# Patient Record
Sex: MALE | Race: WHITE | NOT HISPANIC OR LATINO | Employment: FULL TIME | URBAN - METROPOLITAN AREA
[De-identification: names, ages, dates, MRNs, and addresses within clinical notes are randomized per-mention and may not be internally consistent; named-entity substitution may affect disease eponyms.]

---
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
## 2020-02-04 ENCOUNTER — HISTORICAL (OUTPATIENT)
Dept: ADMINISTRATIVE | Facility: HOSPITAL | Age: 20
End: 2020-02-04

## 2020-02-14 ENCOUNTER — HISTORICAL (OUTPATIENT)
Dept: ADMINISTRATIVE | Facility: HOSPITAL | Age: 20
End: 2020-02-14

## 2020-02-28 ENCOUNTER — HISTORICAL (OUTPATIENT)
Dept: ADMINISTRATIVE | Facility: HOSPITAL | Age: 20
End: 2020-02-28

## 2020-06-07 ENCOUNTER — HISTORICAL (OUTPATIENT)
Dept: ADMINISTRATIVE | Facility: HOSPITAL | Age: 20
End: 2020-06-07

## 2020-08-19 ENCOUNTER — HISTORICAL (OUTPATIENT)
Dept: ADMINISTRATIVE | Facility: HOSPITAL | Age: 20
End: 2020-08-19

## 2022-04-09 ENCOUNTER — HISTORICAL (OUTPATIENT)
Dept: ADMINISTRATIVE | Facility: HOSPITAL | Age: 22
End: 2022-04-09

## 2022-04-18 ENCOUNTER — HISTORICAL (OUTPATIENT)
Dept: ADMINISTRATIVE | Facility: HOSPITAL | Age: 22
End: 2022-04-18

## 2022-04-28 LAB — URATE SERPL-MCNC: 6.9 MG/DL (ref 3.8–8.4)

## 2022-04-29 VITALS
WEIGHT: 169.06 LBS | SYSTOLIC BLOOD PRESSURE: 130 MMHG | BODY MASS INDEX: 25.62 KG/M2 | BODY MASS INDEX: 25.86 KG/M2 | DIASTOLIC BLOOD PRESSURE: 68 MMHG | HEIGHT: 68 IN | BODY MASS INDEX: 26.86 KG/M2 | SYSTOLIC BLOOD PRESSURE: 120 MMHG | HEIGHT: 68 IN | DIASTOLIC BLOOD PRESSURE: 72 MMHG | WEIGHT: 170.63 LBS | WEIGHT: 177.25 LBS | DIASTOLIC BLOOD PRESSURE: 68 MMHG | SYSTOLIC BLOOD PRESSURE: 130 MMHG | HEIGHT: 68 IN

## 2022-05-02 NOTE — HISTORICAL [facility]
This is a historical note converted from Jessica. Formatting and pictures may have been removed.  Please reference Jessica for original formatting and attached multimedia. Chief Complaint  hurt finger 8/15/right thumb  History of Present Illness  19-year-old white male complaining of?right thumb pain. ?Onset?August 15 after he was hit by a metal post?while building a fence.? The pain is localized over the proximal phalanx of the thumb.  Review of Systems  See HPI  Physical Exam  Vitals & Measurements  T:?37.0? ?C (Oral)? HR:?71(Peripheral)? BP:?120/68?  HT:?172.00?cm? WT:?80.400?kg? BMI:?27.18?  General: Awake, alert, no acute distress  Musculoskeletal:?Tenderness to palpation over the proximal phalanx of the thumb.? Active range of motion diminished?at the DIP and PIP joint of the thumb.? Superficial abrasion is present on the?radial aspect of the thumb?but there are no signs of cellulitis.  ?  Right thumb x-ray:?No fracture identified. ?No dislocation.  Assessment/Plan  1.?Contusion of right thumb ? S60.011A  Ice.  Nonsteroidal anti-inflammatories.   Problem List/Past Medical History  Ongoing  Occipital neuralgia  Historical  No qualifying data  Procedure/Surgical History  broken finger  Cholecystectomy  foreign object removed from arm   Medications  Advil 200 mg oral tablet, 400 mg= 2 tab(s), Oral, q4hr, PRN  Carafate 1 g oral tablet, 1 gm= 1 tab(s), Oral, BID  Pantoprazole 40 mg ORAL EC-Tablet, 40 mg= 1 tab(s), Oral, Daily  Allergies  No Known Allergies  No Known Medication Allergies  Social History  Abuse/Neglect  No, 07/27/2020  No, No, Yes, 07/22/2020  Alcohol  Never, 11/03/2017  Substance Use  Never, 11/03/2017  Tobacco  Never (less than 100 in lifetime), N/A, 07/27/2020  Never (less than 100 in lifetime), N/A, 07/22/2020  Never smoker, 11/03/2017  Family History  Hypertension.: Mother.

## 2022-05-02 NOTE — HISTORICAL [facility]
This is a historical note converted from Jessica. Formatting and pictures may have been removed.  Please reference Jessica for original formatting and attached multimedia. Chief Complaint  Back pain  History of Present Illness  19 old white male complaining of?neck pain, upper thoracic right-sided?back pain,?numbness and weakness within the right arm.? His symptoms have been persistent since onset several weeks ago.? He failed to respond to Medrol Dosepak and his symptoms seem to be worsening.? He also reports some numbness in the deltoid region extending all the way down to the lateral aspect of the right arm?near the elbow.? No fever.? No weight loss.  Review of Systems  See HPI  Physical Exam  Vitals & Measurements  T:?36.7? ?C (Oral)? HR:?68(Peripheral)? BP:?130/72?  HT:?173.5?cm? WT:?77.4?kg? BMI:?25.71?  General:?Awake, alert, no acute distress  Neck:?Pain with?right lateral rotation?localized to the base of the right neck.? Mild spasms?of the right paracervical muscles.  Back:?Tenderness to palpation over the right rhomboid?near the medial scapular border?with spasm of the muscle there.  Neuro:?Decreased sensation to light touch over the?right deltoid and right lateral upper arm?with?4 out of 5 strength with right triceps?and right sided intrinsic muscles of the hand  Right shoulder:?Forward flexion?and abduction to about 80 degrees?secondary to weakness.? The shoulder is nontender to palpation.  ?  Cervical spine x-rays: Normal  Assessment/Plan  1.?Cervical radiculopathy?M54.12  ?Due to nerve root findings with weakness?and normal x-ray?exam,?will check an MRI of the cervical spine without contrast for further evaluation.? He is instructed to take Tylenol and ibuprofen as needed for pain symptoms until we have results.? Further evaluation and management based on the MRI results.  Ordered:  MRI Cervical Spine W/O Contrast, Routine, 02/14/20 10:12:00 CST, Other (please specify), Cervical radiculopathy ; Cervical  disc disorder, None, Ambulatory, Rad Type, Order for future visit, [facility]ervical radiculopathy, Outside Facility, 02/14/20 1...  Office/Outpatient Visit Level 4 Established 47032 , Cervical radiculopathy, [facility]ENC[facility], 02/14/20 10:12:00 CST  ?  Orders:   Problem List/Past Medical History  Ongoing  Anosmia  Left arm weakness  Occipital neuralgia  Historical  No qualifying data  Procedure/Surgical History  broken finger  Cholecystectomy  foreign object removed from arm   Medications  Advil 200 mg oral tablet, 400 mg= 2 tab(s), Oral, q4hr, PRN  Allergies  No Known Allergies  No Known Medication Allergies  Social History  Alcohol  Never, 11/03/2017  Substance Use  Never, 11/03/2017  Tobacco  Never smoker, 11/03/2017  Family History  Hypertension.: Mother.

## 2022-05-16 ENCOUNTER — HISTORICAL (OUTPATIENT)
Dept: ADMINISTRATIVE | Facility: HOSPITAL | Age: 22
End: 2022-05-16

## 2023-04-24 ENCOUNTER — OFFICE VISIT (OUTPATIENT)
Dept: FAMILY MEDICINE | Facility: CLINIC | Age: 23
End: 2023-04-24
Payer: COMMERCIAL

## 2023-04-24 VITALS
DIASTOLIC BLOOD PRESSURE: 88 MMHG | BODY MASS INDEX: 29.1 KG/M2 | HEIGHT: 68 IN | HEART RATE: 79 BPM | WEIGHT: 192 LBS | TEMPERATURE: 101 F | SYSTOLIC BLOOD PRESSURE: 134 MMHG | OXYGEN SATURATION: 99 %

## 2023-04-24 DIAGNOSIS — R11.10 VOMITING, UNSPECIFIED VOMITING TYPE, UNSPECIFIED WHETHER NAUSEA PRESENT: ICD-10-CM

## 2023-04-24 DIAGNOSIS — R50.9 FEVER, UNSPECIFIED FEVER CAUSE: ICD-10-CM

## 2023-04-24 DIAGNOSIS — J10.1 TYPE A INFLUENZA: Primary | ICD-10-CM

## 2023-04-24 DIAGNOSIS — J02.9 SORE THROAT: ICD-10-CM

## 2023-04-24 LAB
CTP QC/QA: YES
CTP QC/QA: YES
FLUAV AG NPH QL: POSITIVE
FLUBV AG NPH QL: NEGATIVE
S PYO RRNA THROAT QL PROBE: NEGATIVE

## 2023-04-24 PROCEDURE — 1159F MED LIST DOCD IN RCRD: CPT | Mod: CPTII,,, | Performed by: NURSE PRACTITIONER

## 2023-04-24 PROCEDURE — 3008F PR BODY MASS INDEX (BMI) DOCUMENTED: ICD-10-PCS | Mod: CPTII,,, | Performed by: NURSE PRACTITIONER

## 2023-04-24 PROCEDURE — 99213 OFFICE O/P EST LOW 20 MIN: CPT | Mod: ,,, | Performed by: NURSE PRACTITIONER

## 2023-04-24 PROCEDURE — 1159F PR MEDICATION LIST DOCUMENTED IN MEDICAL RECORD: ICD-10-PCS | Mod: CPTII,,, | Performed by: NURSE PRACTITIONER

## 2023-04-24 PROCEDURE — 3075F PR MOST RECENT SYSTOLIC BLOOD PRESS GE 130-139MM HG: ICD-10-PCS | Mod: CPTII,,, | Performed by: NURSE PRACTITIONER

## 2023-04-24 PROCEDURE — 87880 STREP A ASSAY W/OPTIC: CPT | Mod: QW,,, | Performed by: NURSE PRACTITIONER

## 2023-04-24 PROCEDURE — 87880 POCT RAPID STREP A: ICD-10-PCS | Mod: QW,,, | Performed by: NURSE PRACTITIONER

## 2023-04-24 PROCEDURE — 3008F BODY MASS INDEX DOCD: CPT | Mod: CPTII,,, | Performed by: NURSE PRACTITIONER

## 2023-04-24 PROCEDURE — 3075F SYST BP GE 130 - 139MM HG: CPT | Mod: CPTII,,, | Performed by: NURSE PRACTITIONER

## 2023-04-24 PROCEDURE — 87400 POCT INFLUENZA A/B: ICD-10-PCS | Mod: QW,,, | Performed by: NURSE PRACTITIONER

## 2023-04-24 PROCEDURE — 3079F PR MOST RECENT DIASTOLIC BLOOD PRESSURE 80-89 MM HG: ICD-10-PCS | Mod: CPTII,,, | Performed by: NURSE PRACTITIONER

## 2023-04-24 PROCEDURE — 87400 INFLUENZA A/B EACH AG IA: CPT | Mod: QW,,, | Performed by: NURSE PRACTITIONER

## 2023-04-24 PROCEDURE — 99213 PR OFFICE/OUTPT VISIT, EST, LEVL III, 20-29 MIN: ICD-10-PCS | Mod: ,,, | Performed by: NURSE PRACTITIONER

## 2023-04-24 PROCEDURE — 3079F DIAST BP 80-89 MM HG: CPT | Mod: CPTII,,, | Performed by: NURSE PRACTITIONER

## 2023-04-24 RX ORDER — ONDANSETRON 4 MG/1
4 TABLET, ORALLY DISINTEGRATING ORAL EVERY 8 HOURS PRN
Qty: 15 TABLET | Refills: 0 | Status: SHIPPED | OUTPATIENT
Start: 2023-04-24 | End: 2023-05-02

## 2023-04-24 RX ORDER — GUAIFENESIN 1200 MG
TABLET, EXTENDED RELEASE 12 HR ORAL
COMMUNITY
End: 2023-05-02

## 2023-04-24 RX ORDER — IBUPROFEN 200 MG
TABLET ORAL EVERY 6 HOURS PRN
COMMUNITY
End: 2023-05-02

## 2023-04-24 RX ORDER — OSELTAMIVIR PHOSPHATE 75 MG/1
75 CAPSULE ORAL 2 TIMES DAILY
Qty: 10 CAPSULE | Refills: 0 | Status: SHIPPED | OUTPATIENT
Start: 2023-04-24 | End: 2023-04-29

## 2023-04-24 NOTE — PROGRESS NOTES
"SUBJECTIVE:  Leonides Rangel is a 22 y.o. male here for Fever, Emesis, and Fatigue      Fever   Associated symptoms include vomiting.   Emesis   Associated symptoms include a fever and myalgias.   Fatigue  Associated symptoms include fatigue, a fever, myalgias and vomiting.   Presents to the clinic with c/o fever, vomiting, fatigue and body aches.  Symptoms started on yesterday.  Temp was as high as 103 last night.    Mavericks allergies, medications, history, and problem list were updated as appropriate.    Review of Systems   Constitutional:  Positive for fatigue and fever.   Gastrointestinal:  Positive for vomiting.   Musculoskeletal:  Positive for myalgias.    A comprehensive review of symptoms was completed and negative except as noted above.    Recent Results (from the past 504 hour(s))   POCT Rapid Strep A    Collection Time: 04/24/23  3:21 PM   Result Value Ref Range    Rapid Strep A Screen Negative Negative     Acceptable Yes    POCT Influenza A/B Rapid Antigen    Collection Time: 04/24/23  3:21 PM   Result Value Ref Range    Rapid Influenza A Ag Positive (A) Negative    Rapid Influenza B Ag Negative Negative     Acceptable Yes        OBJECTIVE:  Vital signs  Vitals:    04/24/23 1451   BP: 134/88   BP Location: Right arm   Patient Position: Sitting   Pulse: 79   Temp: (!) 100.6 °F (38.1 °C)   TempSrc: Temporal   SpO2: 99%   Weight: 87.1 kg (192 lb)   Height: 5' 8.11" (1.73 m)        Physical Exam  Constitutional:       Appearance: He is ill-appearing.   HENT:      Head: Normocephalic and atraumatic.      Right Ear: Tympanic membrane, ear canal and external ear normal.      Left Ear: Tympanic membrane, ear canal and external ear normal.      Nose: Nose normal.      Mouth/Throat:      Mouth: Mucous membranes are moist.      Pharynx: Oropharynx is clear.   Eyes:      Conjunctiva/sclera: Conjunctivae normal.   Cardiovascular:      Rate and Rhythm: Normal rate and regular rhythm. "   Pulmonary:      Effort: Pulmonary effort is normal.      Breath sounds: Normal breath sounds.   Abdominal:      General: Bowel sounds are normal.      Palpations: Abdomen is soft.   Musculoskeletal:      Cervical back: Normal range of motion and neck supple.   Skin:     General: Skin is warm.      Capillary Refill: Capillary refill takes less than 2 seconds.   Neurological:      Mental Status: He is alert.   Psychiatric:         Mood and Affect: Mood normal.         Behavior: Behavior normal.         Judgment: Judgment normal.        ASSESSMENT/PLAN:  1. Type A influenza  -     oseltamivir (TAMIFLU) 75 MG capsule; Take 1 capsule (75 mg total) by mouth 2 (two) times daily. for 5 days  Dispense: 10 capsule; Refill: 0    2. Fever, unspecified fever cause  -     POCT Influenza A/B Rapid Antigen  -     POCT Rapid Strep A    3. Sore throat  -     POCT Rapid Strep A    4. Vomiting, unspecified vomiting type, unspecified whether nausea present  -     ondansetron (ZOFRAN-ODT) 4 MG TbDL; Take 1 tablet (4 mg total) by mouth every 8 (eight) hours as needed.  Dispense: 15 tablet; Refill: 0        Follow Up:  No follow-ups on file.

## 2023-04-27 ENCOUNTER — HOSPITAL ENCOUNTER (EMERGENCY)
Facility: HOSPITAL | Age: 23
Discharge: HOME | End: 2023-04-27
Attending: FAMILY MEDICINE
Payer: COMMERCIAL

## 2023-04-27 ENCOUNTER — NURSE TRIAGE (OUTPATIENT)
Dept: ADMINISTRATIVE | Facility: CLINIC | Age: 23
End: 2023-04-27
Payer: COMMERCIAL

## 2023-04-27 VITALS
HEART RATE: 90 BPM | BODY MASS INDEX: 28.79 KG/M2 | WEIGHT: 190 LBS | SYSTOLIC BLOOD PRESSURE: 118 MMHG | OXYGEN SATURATION: 95 % | TEMPERATURE: 98 F | HEIGHT: 68 IN | RESPIRATION RATE: 18 BRPM | DIASTOLIC BLOOD PRESSURE: 78 MMHG

## 2023-04-27 DIAGNOSIS — J11.1 INFLUENZA: ICD-10-CM

## 2023-04-27 DIAGNOSIS — B34.9 VIRAL SYNDROME: Primary | ICD-10-CM

## 2023-04-27 PROCEDURE — 25000003 PHARM REV CODE 250: Performed by: FAMILY MEDICINE

## 2023-04-27 PROCEDURE — 99284 EMERGENCY DEPT VISIT MOD MDM: CPT | Mod: 25

## 2023-04-27 PROCEDURE — 96374 THER/PROPH/DIAG INJ IV PUSH: CPT

## 2023-04-27 PROCEDURE — 96375 TX/PRO/DX INJ NEW DRUG ADDON: CPT

## 2023-04-27 PROCEDURE — 96361 HYDRATE IV INFUSION ADD-ON: CPT

## 2023-04-27 PROCEDURE — 63600175 PHARM REV CODE 636 W HCPCS: Performed by: NURSE PRACTITIONER

## 2023-04-27 PROCEDURE — 25000003 PHARM REV CODE 250: Performed by: NURSE PRACTITIONER

## 2023-04-27 RX ORDER — SODIUM CHLORIDE 9 MG/ML
1000 INJECTION, SOLUTION INTRAVENOUS
Status: COMPLETED | OUTPATIENT
Start: 2023-04-27 | End: 2023-04-27

## 2023-04-27 RX ORDER — DEXAMETHASONE 4 MG/1
4 TABLET ORAL DAILY
Qty: 4 TABLET | Refills: 0 | Status: SHIPPED | OUTPATIENT
Start: 2023-04-27 | End: 2023-05-02

## 2023-04-27 RX ORDER — ACETAMINOPHEN 500 MG
1000 TABLET ORAL
Status: COMPLETED | OUTPATIENT
Start: 2023-04-27 | End: 2023-04-27

## 2023-04-27 RX ORDER — DEXAMETHASONE SODIUM PHOSPHATE 4 MG/ML
4 INJECTION, SOLUTION INTRA-ARTICULAR; INTRALESIONAL; INTRAMUSCULAR; INTRAVENOUS; SOFT TISSUE
Status: COMPLETED | OUTPATIENT
Start: 2023-04-27 | End: 2023-04-27

## 2023-04-27 RX ORDER — KETOROLAC TROMETHAMINE 30 MG/ML
15 INJECTION, SOLUTION INTRAMUSCULAR; INTRAVENOUS
Status: COMPLETED | OUTPATIENT
Start: 2023-04-27 | End: 2023-04-27

## 2023-04-27 RX ADMIN — KETOROLAC TROMETHAMINE 15 MG: 30 INJECTION, SOLUTION INTRAMUSCULAR; INTRAVENOUS at 07:04

## 2023-04-27 RX ADMIN — SODIUM CHLORIDE 1000 ML: 9 INJECTION, SOLUTION INTRAVENOUS at 08:04

## 2023-04-27 RX ADMIN — ACETAMINOPHEN 1000 MG: 500 TABLET, FILM COATED ORAL at 07:04

## 2023-04-27 RX ADMIN — DEXAMETHASONE SODIUM PHOSPHATE 4 MG: 4 INJECTION, SOLUTION INTRA-ARTICULAR; INTRALESIONAL; INTRAMUSCULAR; INTRAVENOUS; SOFT TISSUE at 07:04

## 2023-04-27 RX ADMIN — SODIUM CHLORIDE 1000 ML: 9 INJECTION, SOLUTION INTRAVENOUS at 07:04

## 2023-04-27 NOTE — TELEPHONE ENCOUNTER
Vianey reports that pt was seen 4/24/23 with Flu A. Now c/o temp 103.4 ax and chest pain 4/10. Care advise to go to ED per protocol. Verbalized understanding. Encounter routed to provider.       Reason for Disposition   Chest pain  (Exception: MILD central chest pain, present only when coughing)    Additional Information   Negative: SEVERE difficulty breathing (e.g., struggling for each breath, speaks in single words)   Negative: Bluish (or gray) lips or face now   Negative: Shock suspected (e.g., cold/pale/clammy skin, too weak to stand, low BP, rapid pulse)   Negative: Sounds like a life-threatening emergency to the triager    Protocols used: Influenza Follow-up Call-A-

## 2023-04-27 NOTE — Clinical Note
"Leonides Whitman"Juan Carlos was seen and treated in our emergency department on 4/27/2023.  He may return to work on 05/01/2023.       If you have any questions or concerns, please don't hesitate to call.      GRUPO Jose"

## 2023-04-28 NOTE — ED PROVIDER NOTES
Encounter Date: 4/27/2023       History     Chief Complaint   Patient presents with    Influenza     Dx flu on Monday reports to ed with worse symptoms.  Fever tachycardia body aches headache. Last tylenol 650mg 4pm and last advil 600mg 5pm     Influenza + Diagnosed Monday  Fever, and headache  On day 3 tamiflu  Vomited x 1 day, no n/v/d today tolerating intake      The history is provided by the patient and a parent. No  was used.   Review of patient's allergies indicates:  No Known Allergies  No past medical history on file.  No past surgical history on file.  No family history on file.  Social History     Tobacco Use    Smoking status: Never    Smokeless tobacco: Never     Review of Systems    Physical Exam     Initial Vitals [04/27/23 1907]   BP Pulse Resp Temp SpO2   125/74 (!) 133 18 (!) 103.5 °F (39.7 °C) 98 %      MAP       --         Physical Exam    Vitals reviewed.  Constitutional: He appears well-developed and well-nourished. No distress.   HENT:   Head: Normocephalic and atraumatic.   Mouth/Throat: No oropharyngeal exudate.   Eyes: EOM are normal. Pupils are equal, round, and reactive to light.   Neck: Neck supple.   Normal range of motion.  Cardiovascular:  Regular rhythm.           No murmur heard.  + tachycardic   Pulmonary/Chest: No respiratory distress. He has no wheezes.   Abdominal: Abdomen is soft. Bowel sounds are normal.   Musculoskeletal:         General: Normal range of motion.      Cervical back: Normal range of motion and neck supple.     Lymphadenopathy:     He has no cervical adenopathy.   Neurological: He is alert and oriented to person, place, and time. GCS score is 15. GCS eye subscore is 4. GCS verbal subscore is 5. GCS motor subscore is 6.   Skin: Skin is warm and dry. Capillary refill takes less than 2 seconds.   Psychiatric: He has a normal mood and affect.       ED Course   Procedures  Labs Reviewed - No data to display       Imaging Results    None           Medications   acetaminophen tablet 1,000 mg (1,000 mg Oral Given 4/27/23 1916)   0.9%  NaCl infusion (0 mLs Intravenous Stopped 4/27/23 2047)   ketorolac injection 15 mg (15 mg Intravenous Given 4/27/23 1949)   dexAMETHasone injection 4 mg (4 mg Intravenous Given 4/27/23 1949)   0.9%  NaCl infusion (1,000 mLs Intravenous New Bag 4/27/23 2022)     Medical Decision Making:   Initial Assessment:   Dehydration secondary to Viral syncrome  Differential Diagnosis:   influenza  ED Management:  Discussed hydration with patient and mother.    Increase fluids at home Pedialyte or liquid IV  Continue fever management and tamiflu    Return if any new or worsening symptoms    Symptoms improved aftertreatment patient and mother verbalize understanding of discharge and are in agreement with paln                        Clinical Impression:   Final diagnoses:  [B34.9] Viral syndrome (Primary)  [J11.1] Influenza        ED Disposition Condition    Discharge Stable          ED Prescriptions       Medication Sig Dispense Start Date End Date Auth. Provider    dexAMETHasone (DECADRON) 4 MG Tab Take 1 tablet (4 mg total) by mouth once daily. for 4 days 4 tablet 4/27/2023 5/1/2023 GRUPO Jose          Follow-up Information       Follow up With Specialties Details Why Contact Info    Aron Soriano MD Family Medicine Schedule an appointment as soon as possible for a visit  If symptoms worsen, As needed [address] [facility] [address]  [phone]               GRUPO Jose  04/27/23 2051       GRUPO Jose  04/27/23 2121

## 2023-04-29 ENCOUNTER — HOSPITAL ENCOUNTER (EMERGENCY)
Facility: HOSPITAL | Age: 23
Discharge: HOME | End: 2023-04-29
Attending: FAMILY MEDICINE
Payer: COMMERCIAL

## 2023-04-29 VITALS
SYSTOLIC BLOOD PRESSURE: 101 MMHG | HEART RATE: 99 BPM | BODY MASS INDEX: 28.79 KG/M2 | RESPIRATION RATE: 20 BRPM | WEIGHT: 190 LBS | DIASTOLIC BLOOD PRESSURE: 63 MMHG | OXYGEN SATURATION: 96 % | HEIGHT: 68 IN | TEMPERATURE: 98 F

## 2023-04-29 DIAGNOSIS — R31.9 URINARY TRACT INFECTION WITH HEMATURIA, SITE UNSPECIFIED: Primary | ICD-10-CM

## 2023-04-29 DIAGNOSIS — R05.9 COUGH: ICD-10-CM

## 2023-04-29 DIAGNOSIS — N39.0 URINARY TRACT INFECTION WITH HEMATURIA, SITE UNSPECIFIED: Primary | ICD-10-CM

## 2023-04-29 LAB
ABS NEUT CALC (OHS): 3.83 X10(3)/MCL (ref 2.1–9.2)
ALBUMIN SERPL-MCNC: 3.3 G/DL (ref 3.4–5)
ALBUMIN/GLOB SERPL: 1.1 RATIO
ALP SERPL-CCNC: 155 UNIT/L (ref 50–144)
ALT SERPL-CCNC: 100 UNIT/L (ref 1–45)
ANION GAP SERPL CALC-SCNC: 7 MEQ/L (ref 2–13)
APPEARANCE UR: CLEAR
AST SERPL-CCNC: 119 UNIT/L (ref 17–59)
BACTERIA #/AREA URNS AUTO: ABNORMAL /HPF
BILIRUB UR QL STRIP.AUTO: ABNORMAL MG/DL
BILIRUBIN DIRECT+TOT PNL SERPL-MCNC: 1.5 MG/DL (ref 0–1)
BUN SERPL-MCNC: 18 MG/DL (ref 7–20)
CALCIUM SERPL-MCNC: 8.4 MG/DL (ref 8.4–10.2)
CHLORIDE SERPL-SCNC: 104 MMOL/L (ref 98–110)
CO2 SERPL-SCNC: 24 MMOL/L (ref 21–32)
COLOR UR AUTO: YELLOW
CREAT SERPL-MCNC: 1.22 MG/DL (ref 0.66–1.25)
CREAT/UREA NIT SERPL: 15 (ref 12–20)
ERYTHROCYTE [DISTWIDTH] IN BLOOD BY AUTOMATED COUNT: 12.6 % (ref 11.6–14.4)
GFR SERPLBLD CREATININE-BSD FMLA CKD-EPI: 85 MLS/MIN/1.73/M2
GLOBULIN SER-MCNC: 3 GM/DL (ref 2–3.9)
GLUCOSE SERPL-MCNC: 112 MG/DL (ref 70–115)
GLUCOSE UR QL STRIP.AUTO: 100 MG/DL
HCT VFR BLD AUTO: 35.8 % (ref 36–52)
HGB BLD-MCNC: 12.6 G/DL (ref 13–18)
HYALINE CASTS URNS QL MICRO: ABNORMAL /LPF
IMM GRANULOCYTES # BLD AUTO: 0.04 X10(3)/MCL (ref 0–0.03)
IMM GRANULOCYTES NFR BLD AUTO: 0.9 % (ref 0–0.5)
KETONES UR QL STRIP.AUTO: ABNORMAL MG/DL
LEUKOCYTE ESTERASE UR QL STRIP.AUTO: NEGATIVE UNIT/L
LYMPHOCYTES NFR BLD MANUAL: 0.36 X10(3)/MCL
LYMPHOCYTES NFR BLD MANUAL: 8 % (ref 13–40)
MCH RBC QN AUTO: 29 PG (ref 27–34)
MCV RBC AUTO: 82.3 FL (ref 79–99)
MEAN CELL HEMOGLOBIN CONCENTRATION (OHS) G/DL: 35.2 G/DL (ref 31–37)
MONOCYTES NFR BLD MANUAL: 0.32 X10(3)/MCL (ref 0.1–1.3)
MONOCYTES NFR BLD MANUAL: 7 % (ref 2–11)
MUCOUS THREADS URNS QL MICRO: ABNORMAL /LPF
NEUTROPHILS NFR BLD MANUAL: 84 % (ref 47–80)
NEUTS BAND NFR BLD MANUAL: 1 % (ref 0–11)
NITRITE UR QL STRIP.AUTO: POSITIVE
NRBC BLD AUTO-RTO: 0 % (ref 0–1)
PH UR STRIP.AUTO: 5.5 [PH]
PLATELET # BLD AUTO: 52 X10(3)/MCL (ref 140–371)
PLATELET # BLD EST: ABNORMAL 10*3/UL
PMV BLD AUTO: 12.2 FL (ref 9.4–12.4)
POTASSIUM SERPL-SCNC: 3.2 MMOL/L (ref 3.5–5.1)
PROT SERPL-MCNC: 6.3 GM/DL (ref 6.3–8.2)
PROT UR QL STRIP.AUTO: 100 MG/DL
RBC # BLD AUTO: 4.35 X10(6)/MCL (ref 4–6)
RBC #/AREA URNS AUTO: ABNORMAL /HPF
RBC MORPH BLD: NORMAL
RBC UR QL AUTO: ABNORMAL UNIT/L
SODIUM SERPL-SCNC: 135 MMOL/L (ref 135–145)
SP GR UR STRIP.AUTO: 1.01
SQUAMOUS #/AREA URNS AUTO: ABNORMAL /HPF
UROBILINOGEN UR STRIP-ACNC: 2 MG/DL
WBC # SPEC AUTO: 4.5 X10(3)/MCL (ref 4–11.5)
WBC #/AREA URNS AUTO: ABNORMAL /HPF

## 2023-04-29 PROCEDURE — 99284 EMERGENCY DEPT VISIT MOD MDM: CPT | Mod: 25

## 2023-04-29 PROCEDURE — 25000003 PHARM REV CODE 250: Performed by: FAMILY MEDICINE

## 2023-04-29 PROCEDURE — 80053 COMPREHEN METABOLIC PANEL: CPT | Performed by: FAMILY MEDICINE

## 2023-04-29 PROCEDURE — 96360 HYDRATION IV INFUSION INIT: CPT

## 2023-04-29 PROCEDURE — 87088 URINE BACTERIA CULTURE: CPT | Performed by: FAMILY MEDICINE

## 2023-04-29 PROCEDURE — 85027 COMPLETE CBC AUTOMATED: CPT | Performed by: FAMILY MEDICINE

## 2023-04-29 PROCEDURE — 85025 COMPLETE CBC W/AUTO DIFF WBC: CPT | Performed by: FAMILY MEDICINE

## 2023-04-29 PROCEDURE — 36415 COLL VENOUS BLD VENIPUNCTURE: CPT | Performed by: FAMILY MEDICINE

## 2023-04-29 PROCEDURE — 81001 URINALYSIS AUTO W/SCOPE: CPT | Performed by: FAMILY MEDICINE

## 2023-04-29 RX ORDER — ACETAMINOPHEN 500 MG
1000 TABLET ORAL
Status: COMPLETED | OUTPATIENT
Start: 2023-04-29 | End: 2023-04-29

## 2023-04-29 RX ORDER — LEVOFLOXACIN 500 MG/1
500 TABLET, FILM COATED ORAL DAILY
Qty: 7 TABLET | Refills: 0 | Status: SHIPPED | OUTPATIENT
Start: 2023-04-29 | End: 2023-05-03 | Stop reason: SINTOL

## 2023-04-29 RX ORDER — SODIUM CHLORIDE 9 MG/ML
1000 INJECTION, SOLUTION INTRAVENOUS ONCE
Status: DISCONTINUED | OUTPATIENT
Start: 2023-04-29 | End: 2023-04-29 | Stop reason: HOSPADM

## 2023-04-29 RX ADMIN — SODIUM CHLORIDE 1000 ML: 9 INJECTION, SOLUTION INTRAVENOUS at 11:04

## 2023-04-29 RX ADMIN — ACETAMINOPHEN 1000 MG: 500 TABLET, FILM COATED ORAL at 10:04

## 2023-04-29 NOTE — ED PROVIDER NOTES
Encounter Date: 4/29/2023       History     Chief Complaint   Patient presents with    Abdominal Pain     C/o back pain, headache, fever, abd pain, pt reports was seen by np on Monday and dx w/ flu a, seen Thursday in er, given fluids, steroids and tramadol and a 1000mg tylenol, pt reports still not feeling well and I feel dehydrated.  Last took advil, and severe cold and flu med and 600mg advil at 9am     Chief Complaint  Patient presents with  · Abdominal Pain    C/o back pain, headache, fever, abd pain, pt reports was seen by np on Monday and dx w/ flu a, seen Thursday in er, given fluids, steroids and tramadol and a 1000mg tylenol, pt reports still not feeling well and I feel dehydrated.  Last took advil, and severe cold and flu med and 600mg advil at 9am   Patient is still having fever he complains of headache but there is no nuchal rigidity I flexed the neck 4 or 5 times and the patient did not had any increased intensity of his headache patient has been having cough and his SARs COVID test was negative I am going to look for superadded bacterial pneumonia I will check some blood work rule out leukocytosis patient has also been having dysuria check UA        Review of patient's allergies indicates:  No Known Allergies  History reviewed. No pertinent past medical history.  Past Surgical History:   Procedure Laterality Date    CHOLECYSTECTOMY       History reviewed. No pertinent family history.  Social History     Tobacco Use    Smoking status: Never    Smokeless tobacco: Never   Substance Use Topics    Alcohol use: Not Currently    Drug use: Never     Review of Systems   Constitutional:  Negative for activity change, appetite change, diaphoresis and fatigue.   HENT:  Negative for congestion, ear discharge, ear pain, hearing loss, postnasal drip, sinus pain and sore throat.    Eyes:  Negative for pain.   Respiratory:  Positive for cough and shortness of breath. Negative for apnea, choking and stridor.     Cardiovascular:  Negative for palpitations.   Gastrointestinal:  Negative for abdominal distention, abdominal pain, constipation and diarrhea.   Endocrine: Negative for cold intolerance and heat intolerance.   Genitourinary:  Positive for dysuria. Negative for difficulty urinating, enuresis, frequency, penile discharge, penile pain, scrotal swelling and testicular pain.   Neurological:  Positive for headaches. Negative for dizziness, seizures, syncope, facial asymmetry and light-headedness.   Hematological:  Negative for adenopathy.   Psychiatric/Behavioral:  Negative for agitation, behavioral problems, decreased concentration and hallucinations. The patient is not hyperactive.      Physical Exam     Initial Vitals [04/29/23 1008]   BP Pulse Resp Temp SpO2   (!) 98/45 (!) 130 20 (!) 102.2 °F (39 °C) 95 %      MAP       --         Physical Exam    Nursing note and vitals reviewed.  Constitutional: He appears well-developed.   HENT:   Head: Normocephalic.   Eyes: Pupils are equal, round, and reactive to light.   Neck:   Normal range of motion.  Cardiovascular:  Normal rate, regular rhythm, normal heart sounds and intact distal pulses.           Pulmonary/Chest: No respiratory distress. He has no wheezes. He has no rales.   Abdominal: Abdomen is soft. Bowel sounds are normal. He exhibits no mass. There is no abdominal tenderness. There is no rebound and no guarding.   Musculoskeletal:         General: Normal range of motion.      Cervical back: Normal range of motion.     Neurological: He is alert.   Skin: Skin is warm.   Psychiatric: He has a normal mood and affect. Thought content normal.       ED Course   Procedures  Labs Reviewed   COMPREHENSIVE METABOLIC PANEL - Abnormal; Notable for the following components:       Result Value    Potassium Level 3.2 (*)     Albumin Level 3.3 (*)     Bilirubin Total 1.5 (*)     Alkaline Phosphatase 155 (*)     Alanine Aminotransferase 100 (*)     Aspartate Aminotransferase 119  (*)     All other components within normal limits   CBC WITH DIFFERENTIAL - Abnormal; Notable for the following components:    Hgb 12.6 (*)     Hct 35.8 (*)     Platelet 52 (*)     IG# 0.04 (*)     IG% 0.9 (*)     All other components within normal limits   URINALYSIS - Abnormal; Notable for the following components:    Protein,  (*)     Glucose,  (*)     Ketones, UA Trace (*)     Blood, UA Moderate (*)     Bilirubin, UA Moderate (*)     Urobilinogen, UA 2.0 (*)     Nitrites, UA Positive (*)     All other components within normal limits    Narrative:      URINE STABILITY IS 2 HOURS AT ROOM TEMP OR    SIX HOURS REFRIGERATED. PERFORMING TESTING ON    SPECIMENS GREATER THAN THIS AGE MAY AFFECT THE    FOLLOWING TESTS:    PH          SPECIFIC GRAVITY           BLOOD    CLARITY     BILIRUBIN               UROBILINOGEN   MANUAL DIFFERENTIAL - Abnormal; Notable for the following components:    Neut Man 84 (*)     Lymph Man 8 (*)     Abs Lymp 0.36 (*)     Platelet Est Decreased (*)     All other components within normal limits   URINALYSIS, MICROSCOPIC - Abnormal; Notable for the following components:    Bacteria, UA 2+ (*)     Hyaline Casts, UA Few (*)     Mucous, UA Trace (*)     Squamous Epithelial Cells, UA Few (*)     All other components within normal limits   CULTURE, URINE   CBC W/ AUTO DIFFERENTIAL    Narrative:     The following orders were created for panel order CBC auto differential.  Procedure                               Abnormality         Status                     ---------                               -----------         ------                     CBC with Differential[[id number]]        Abnormal            Final result               Manual Differential[[id number]]          Abnormal            Final result                 Please view results for these tests on the individual orders.          Imaging Results              X-Ray Chest PA And Lateral (In process)                   X-Rays:    Independently Interpreted Readings:   Other Readings:   Infiltrate versus parapneumonic effusion on the left side  Medications   0.9%  NaCl infusion (has no administration in time range)   acetaminophen tablet 1,000 mg (1,000 mg Oral Given 4/29/23 1021)   sodium chloride 0.9% bolus 1,000 mL 1,000 mL (1,000 mLs Intravenous New Bag 4/29/23 1133)     Medical Decision Making:   ED Management:   Patient has persistent fever for more than 5 days unlikely for the influenza to last that long and there is evidence of leukocyte esterase in the urine patient also having dysuria and on the chest x-ray there is parapneumonic effusion versus infiltrate on the left side question bacterial pathology I am going to give him a trial of Levaquin to cover the pneumonia and the UTI as well and see the response I carefully palpated the neck twice and the patient did not had any nuchal rigidity                        Clinical Impression:   Final diagnoses:  [R05.9] Cough  [N39.0, R31.9] Urinary tract infection with hematuria, site unspecified (Primary)        ED Disposition Condition    Discharge Stable          ED Prescriptions       Medication Sig Dispense Start Date End Date Auth. Provider    levoFLOXacin (LEVAQUIN) 500 MG tablet Take 1 tablet (500 mg total) by mouth once daily. for 7 days 7 tablet 4/29/2023 5/6/2023 Enzo Junior MD          Follow-up Information       Follow up With Specialties Details Why Contact Info    Aron Soriano MD Family Medicine   [address] [facility]  [address] [facility] [address]  [phone]               Enzo Junior MD  04/29/23 8613

## 2023-05-02 ENCOUNTER — OFFICE VISIT (OUTPATIENT)
Dept: FAMILY MEDICINE | Facility: CLINIC | Age: 23
End: 2023-05-02
Payer: COMMERCIAL

## 2023-05-02 VITALS
OXYGEN SATURATION: 97 % | BODY MASS INDEX: 28.77 KG/M2 | HEIGHT: 68 IN | SYSTOLIC BLOOD PRESSURE: 116 MMHG | HEART RATE: 102 BPM | TEMPERATURE: 99 F | WEIGHT: 189.81 LBS | DIASTOLIC BLOOD PRESSURE: 70 MMHG

## 2023-05-02 DIAGNOSIS — J18.9 PNEUMONIA OF LEFT LOWER LOBE DUE TO INFECTIOUS ORGANISM: Primary | ICD-10-CM

## 2023-05-02 DIAGNOSIS — D69.6 THROMBOCYTOPENIA: ICD-10-CM

## 2023-05-02 DIAGNOSIS — R74.8 ELEVATED LIVER ENZYMES: ICD-10-CM

## 2023-05-02 LAB
ABS NEUT CALC (OHS): 7.2 X10(3)/MCL (ref 2.1–9.2)
ALBUMIN SERPL-MCNC: 3.3 G/DL (ref 3.4–5)
ALBUMIN/GLOB SERPL: 1.1 RATIO
ALP SERPL-CCNC: 189 UNIT/L (ref 50–144)
ALT SERPL-CCNC: 161 UNIT/L (ref 1–45)
ANION GAP SERPL CALC-SCNC: 9 MEQ/L (ref 2–13)
AST SERPL-CCNC: 163 UNIT/L (ref 17–59)
BACTERIA UR CULT: NO GROWTH
BILIRUBIN DIRECT+TOT PNL SERPL-MCNC: 0.9 MG/DL (ref 0–1)
BUN SERPL-MCNC: 21 MG/DL (ref 7–20)
CALCIUM SERPL-MCNC: 8 MG/DL (ref 8.4–10.2)
CHLORIDE SERPL-SCNC: 107 MMOL/L (ref 98–110)
CK SERPL-CCNC: 55 U/L (ref 55–170)
CO2 SERPL-SCNC: 24 MMOL/L (ref 21–32)
CREAT SERPL-MCNC: 0.82 MG/DL (ref 0.66–1.25)
CREAT/UREA NIT SERPL: 26 (ref 12–20)
ERYTHROCYTE [DISTWIDTH] IN BLOOD BY AUTOMATED COUNT: 13.5 %
GFR SERPLBLD CREATININE-BSD FMLA CKD-EPI: >90 MLS/MIN/1.73/M2
GLOBULIN SER-MCNC: 2.9 GM/DL (ref 2–3.9)
GLUCOSE SERPL-MCNC: 114 MG/DL (ref 70–115)
HCT VFR BLD AUTO: 39.1 % (ref 36–52)
HGB BLD-MCNC: 13.6 G/DL (ref 13–18)
IMM GRANULOCYTES # BLD AUTO: 0.57 X10(3)/MCL (ref 0–0.03)
IMM GRANULOCYTES NFR BLD AUTO: 4.5 % (ref 0–0.5)
LYMPHOCYTES NFR BLD MANUAL: 2.53 X10(3)/MCL
LYMPHOCYTES NFR BLD MANUAL: 20 % (ref 13–40)
MCH RBC QN AUTO: 28.3 PG (ref 27–34)
MCHC RBC AUTO-ENTMCNC: 34.8 G/DL (ref 31–37)
MCV RBC AUTO: 81.5 FL (ref 79–99)
MONOCYTES NFR BLD MANUAL: 2.91 X10(3)/MCL (ref 0.1–1.3)
MONOCYTES NFR BLD MANUAL: 23 % (ref 2–11)
NEUTROPHILS NFR BLD MANUAL: 57 % (ref 47–80)
NRBC BLD AUTO-RTO: 0 % (ref 0–1)
PLATELET # BLD AUTO: 140 X10(3)/MCL (ref 140–371)
PLATELET # BLD EST: ADEQUATE 10*3/UL
PMV BLD AUTO: 12.8 FL (ref 9.4–12.4)
POTASSIUM SERPL-SCNC: 3.7 MMOL/L (ref 3.5–5.1)
PROT SERPL-MCNC: 6.2 GM/DL (ref 6.3–8.2)
RBC # BLD AUTO: 4.8 X10(6)/MCL (ref 4–6)
RBC MORPH BLD: NORMAL
SODIUM SERPL-SCNC: 140 MMOL/L (ref 135–145)
WBC # SPEC AUTO: 12.64 X10(3)/MCL (ref 4–11.5)

## 2023-05-02 PROCEDURE — 1159F MED LIST DOCD IN RCRD: CPT | Mod: CPTII,,, | Performed by: FAMILY MEDICINE

## 2023-05-02 PROCEDURE — 80053 COMPREHEN METABOLIC PANEL: CPT | Performed by: FAMILY MEDICINE

## 2023-05-02 PROCEDURE — 3074F SYST BP LT 130 MM HG: CPT | Mod: CPTII,,, | Performed by: FAMILY MEDICINE

## 2023-05-02 PROCEDURE — 1160F PR REVIEW ALL MEDS BY PRESCRIBER/CLIN PHARMACIST DOCUMENTED: ICD-10-PCS | Mod: CPTII,,, | Performed by: FAMILY MEDICINE

## 2023-05-02 PROCEDURE — 99214 OFFICE O/P EST MOD 30 MIN: CPT | Mod: ,,, | Performed by: FAMILY MEDICINE

## 2023-05-02 PROCEDURE — 87899 AGENT NOS ASSAY W/OPTIC: CPT | Mod: 90 | Performed by: FAMILY MEDICINE

## 2023-05-02 PROCEDURE — 3078F PR MOST RECENT DIASTOLIC BLOOD PRESSURE < 80 MM HG: ICD-10-PCS | Mod: CPTII,,, | Performed by: FAMILY MEDICINE

## 2023-05-02 PROCEDURE — 1160F RVW MEDS BY RX/DR IN RCRD: CPT | Mod: CPTII,,, | Performed by: FAMILY MEDICINE

## 2023-05-02 PROCEDURE — 3078F DIAST BP <80 MM HG: CPT | Mod: CPTII,,, | Performed by: FAMILY MEDICINE

## 2023-05-02 PROCEDURE — 3008F BODY MASS INDEX DOCD: CPT | Mod: CPTII,,, | Performed by: FAMILY MEDICINE

## 2023-05-02 PROCEDURE — 3074F PR MOST RECENT SYSTOLIC BLOOD PRESSURE < 130 MM HG: ICD-10-PCS | Mod: CPTII,,, | Performed by: FAMILY MEDICINE

## 2023-05-02 PROCEDURE — 85025 COMPLETE CBC W/AUTO DIFF WBC: CPT | Performed by: FAMILY MEDICINE

## 2023-05-02 PROCEDURE — 99214 PR OFFICE/OUTPT VISIT, EST, LEVL IV, 30-39 MIN: ICD-10-PCS | Mod: ,,, | Performed by: FAMILY MEDICINE

## 2023-05-02 PROCEDURE — 82550 ASSAY OF CK (CPK): CPT | Performed by: FAMILY MEDICINE

## 2023-05-02 PROCEDURE — 1159F PR MEDICATION LIST DOCUMENTED IN MEDICAL RECORD: ICD-10-PCS | Mod: CPTII,,, | Performed by: FAMILY MEDICINE

## 2023-05-02 PROCEDURE — 85027 COMPLETE CBC AUTOMATED: CPT | Performed by: FAMILY MEDICINE

## 2023-05-02 PROCEDURE — 3008F PR BODY MASS INDEX (BMI) DOCUMENTED: ICD-10-PCS | Mod: CPTII,,, | Performed by: FAMILY MEDICINE

## 2023-05-02 NOTE — PROGRESS NOTES
"SUBJECTIVE:  HPI    Leonides Rangel is a 22 y.o. male here for Shortness of Breath, Fever, and Cough.  Here for follow-up after recent ER visit on April 29, 2023.  I have reviewed those notes and diagnostic studies including labs and x-ray.    His symptoms actually began about 9 days ago with myalgias followed by fever as high as 105°.  He had persistent fever, myalgias, malaise for the next several days and went to the emergency room on April 27th.  He also had associated gastrointestinal upset with nausea, vomiting and diarrhea.  He was given some IV fluids, diagnosed with a viral infection and given some IV steroids.  He was told that he had influenza.  He continued to have fever and developed a cough and returned to the emergency room on April 29th because his urine became dark colored.  He had a chest x-ray which revealed left lower lobe pneumonia with consolidation and an abnormal urinalysis and was told he may have a urinary tract infection as well.  He was prescribed Levaquin, given some more IV fluids and discharged to home.      Today, he returns to the office reporting that he has significant shortness of breath with dyspnea on minimal exertion.  He was unable to walk up several flights of stairs yesterday.  His fever has improved and overall he feels a little better but he continues to have general malaise, myalgias and worsening cough with dyspnea.  He reports that he is having bilateral thigh pain and weakness.  He has difficulty getting up from the chair.  Of note, his wife developed similar symptoms.      Leonides's allergies, medications, history, and problem list were updated as appropriate.    ROS:  Pertinent ROS as above, otherwise negative    OBJECTIVE:  Vital signs  Visit Vitals  /70 (BP Location: Left arm, Patient Position: Sitting, BP Method: Medium (Manual))   Pulse 102   Temp 99.1 °F (37.3 °C) (Temporal)   Ht 5' 8.31" (1.735 m)   Wt 86.1 kg (189 lb 12.8 oz)   SpO2 97%   BMI 28.60 kg/m² "          PHYSICAL EXAM:  General:  Awake, alert, slightly dyspneic at rest with splinting of respirations  Eyes:  Pupils equal, round, reactive to light.  Conjunctiva normal bilaterally.  Ears:  Bilateral external auditory canals normal.  Bilateral tympanic membranes normal.  Nares:  Bilateral turbinate erythema and edema with clear rhinorrhea.  Oropharynx:  Postnasal drainage present.  No erythema or exudate.  Neck:  No lymphadenopathy  Cardiovascular:  Tachycardic, regular rhythm, no murmurs  Respiratory:  Dyspneic at rest.  Splinting respirations.  Shallow depth of inspiration.  Decreased breath sounds in left lower lobe and left mid lung field with faint crackles appreciated in the lower lobe.  Skin: No rashes      Two-view chest x-ray, my interpretation prior to radiology report:  Shallow depth of inspiration but definite improvement compared to his last x-ray on April 29, 2023 with reduction and atelectasis and consolidation within the left lower lobe and in the interstitial areas.    ASSESSMENT/PLAN:  1. Pneumonia of left lower lobe due to infectious organism  Assessment & Plan:  I am highly suspicious for an atypical pneumonia like Legionella.    We will repeat a CBC, CMP, CPK level    Check a urine Legionella antigen test.  We are unable to collect a sputum at this time because the patient can not produce one.  I suspect that the urine Legionella test will be low yield in light of his current antimicrobial therapy and improving febrile status and improving chest x-ray.    Continue levofloxacin 500 mg daily.      Return to clinic for recheck clinical exam tomorrow.    Orders:  -     X-Ray Chest PA And Lateral; Future; Expected date: 05/02/2023  -     Comprehensive Metabolic Panel; Future; Expected date: 05/02/2023  -     CBC Auto Differential; Future; Expected date: 05/02/2023  -     Legionella antigen, urine  -     CK; Future; Expected date: 05/02/2023    2. Thrombocytopenia  -     Comprehensive Metabolic  Panel; Future; Expected date: 05/02/2023  -     CBC Auto Differential; Future; Expected date: 05/02/2023  -     Legionella antigen, urine  -     CK; Future; Expected date: 05/02/2023    3. Elevated liver enzymes  -     Comprehensive Metabolic Panel; Future; Expected date: 05/02/2023  -     CBC Auto Differential; Future; Expected date: 05/02/2023  -     Legionella antigen, urine  -     CK; Future; Expected date: 05/02/2023      Follow Up:  Follow up in about 1 day (around 5/3/2023) for Follow-up.

## 2023-05-02 NOTE — ASSESSMENT & PLAN NOTE
I am highly suspicious for an atypical pneumonia like Legionella.    We will repeat a CBC, CMP, CPK level    Check a urine Legionella antigen test.  We are unable to collect a sputum at this time because the patient can not produce one.  I suspect that the urine Legionella test will be low yield in light of his current antimicrobial therapy and improving febrile status and improving chest x-ray.    Continue levofloxacin 500 mg daily.      Return to clinic for recheck clinical exam tomorrow.

## 2023-05-03 ENCOUNTER — OFFICE VISIT (OUTPATIENT)
Dept: FAMILY MEDICINE | Facility: CLINIC | Age: 23
End: 2023-05-03
Payer: COMMERCIAL

## 2023-05-03 VITALS
DIASTOLIC BLOOD PRESSURE: 68 MMHG | HEART RATE: 110 BPM | TEMPERATURE: 98 F | SYSTOLIC BLOOD PRESSURE: 98 MMHG | WEIGHT: 189.19 LBS | BODY MASS INDEX: 28.67 KG/M2 | HEIGHT: 68 IN | OXYGEN SATURATION: 99 %

## 2023-05-03 DIAGNOSIS — D69.6 THROMBOCYTOPENIA: ICD-10-CM

## 2023-05-03 DIAGNOSIS — J18.9 PNEUMONIA OF LEFT LOWER LOBE DUE TO INFECTIOUS ORGANISM: Primary | ICD-10-CM

## 2023-05-03 DIAGNOSIS — R74.8 ELEVATED LIVER ENZYMES: ICD-10-CM

## 2023-05-03 LAB
ABS NEUT CALC (OHS): 9.7 X10(3)/MCL (ref 2.1–9.2)
ALBUMIN SERPL-MCNC: 3.3 G/DL (ref 3.4–5)
ALBUMIN/GLOB SERPL: 1.1 RATIO
ALP SERPL-CCNC: 206 UNIT/L (ref 50–144)
ALT SERPL-CCNC: 209 UNIT/L (ref 1–45)
ANION GAP SERPL CALC-SCNC: 7 MEQ/L (ref 2–13)
AST SERPL-CCNC: 201 UNIT/L (ref 17–59)
BILIRUBIN DIRECT+TOT PNL SERPL-MCNC: 1.2 MG/DL (ref 0–1)
BUN SERPL-MCNC: 17 MG/DL (ref 7–20)
C-REACTIVE PROTEIN(NORTH LA, ST. MARY, RP & JENNINGS): 3.8 MG/DL (ref 0–0.9)
CALCIUM SERPL-MCNC: 8.1 MG/DL (ref 8.4–10.2)
CHLORIDE SERPL-SCNC: 109 MMOL/L (ref 98–110)
CO2 SERPL-SCNC: 25 MMOL/L (ref 21–32)
CREAT SERPL-MCNC: 1.06 MG/DL (ref 0.66–1.25)
CREAT/UREA NIT SERPL: 16 (ref 12–20)
EOSINOPHIL NFR BLD MANUAL: 0.16 X10(3)/MCL (ref 0–0.9)
EOSINOPHIL NFR BLD MANUAL: 1 % (ref 0–8)
ERYTHROCYTE [DISTWIDTH] IN BLOOD BY AUTOMATED COUNT: 14 %
GFR SERPLBLD CREATININE-BSD FMLA CKD-EPI: >90 MLS/MIN/1.73/M2
GLOBULIN SER-MCNC: 3.1 GM/DL (ref 2–3.9)
GLUCOSE SERPL-MCNC: 94 MG/DL (ref 70–115)
HCT VFR BLD AUTO: 40.4 % (ref 36–52)
HGB BLD-MCNC: 13.9 G/DL (ref 13–18)
IMM GRANULOCYTES # BLD AUTO: 1.29 X10(3)/MCL (ref 0–0.03)
IMM GRANULOCYTES NFR BLD AUTO: 8 % (ref 0–0.5)
LDH SERPL-CCNC: 869 U/L (ref 313–618)
LEGIONELLA AG UR QL: NEGATIVE
LYMPHOCYTES NFR BLD MANUAL: 25 % (ref 13–40)
LYMPHOCYTES NFR BLD MANUAL: 4.04 X10(3)/MCL
MCH RBC QN AUTO: 28.3 PG (ref 27–34)
MCHC RBC AUTO-ENTMCNC: 34.4 G/DL (ref 31–37)
MCV RBC AUTO: 82.3 FL (ref 79–99)
MONOCYTES NFR BLD MANUAL: 14 % (ref 2–11)
MONOCYTES NFR BLD MANUAL: 2.26 X10(3)/MCL (ref 0.1–1.3)
NEUTROPHILS NFR BLD MANUAL: 60 % (ref 47–80)
NRBC BLD AUTO-RTO: 0 % (ref 0–1)
PLATELET # BLD AUTO: 214 X10(3)/MCL (ref 140–371)
PLATELET # BLD EST: ADEQUATE 10*3/UL
PMV BLD AUTO: 11.8 FL (ref 9.4–12.4)
POTASSIUM SERPL-SCNC: 3.4 MMOL/L (ref 3.5–5.1)
PROT SERPL-MCNC: 6.4 GM/DL (ref 6.3–8.2)
RBC # BLD AUTO: 4.91 X10(6)/MCL (ref 4–6)
RBC MORPH BLD: NORMAL
SODIUM SERPL-SCNC: 141 MMOL/L (ref 135–145)
WBC # SPEC AUTO: 16.16 X10(3)/MCL (ref 4–11.5)

## 2023-05-03 PROCEDURE — 86140 C-REACTIVE PROTEIN: CPT | Performed by: FAMILY MEDICINE

## 2023-05-03 PROCEDURE — 86645 CMV ANTIBODY IGM: CPT | Mod: 90 | Performed by: FAMILY MEDICINE

## 2023-05-03 PROCEDURE — 3074F SYST BP LT 130 MM HG: CPT | Mod: CPTII,,, | Performed by: FAMILY MEDICINE

## 2023-05-03 PROCEDURE — 3008F BODY MASS INDEX DOCD: CPT | Mod: CPTII,,, | Performed by: FAMILY MEDICINE

## 2023-05-03 PROCEDURE — 1159F PR MEDICATION LIST DOCUMENTED IN MEDICAL RECORD: ICD-10-PCS | Mod: CPTII,,, | Performed by: FAMILY MEDICINE

## 2023-05-03 PROCEDURE — 99214 OFFICE O/P EST MOD 30 MIN: CPT | Mod: ,,, | Performed by: FAMILY MEDICINE

## 2023-05-03 PROCEDURE — 86665 EPSTEIN-BARR CAPSID VCA: CPT | Mod: 90 | Performed by: FAMILY MEDICINE

## 2023-05-03 PROCEDURE — 3074F PR MOST RECENT SYSTOLIC BLOOD PRESSURE < 130 MM HG: ICD-10-PCS | Mod: CPTII,,, | Performed by: FAMILY MEDICINE

## 2023-05-03 PROCEDURE — 85027 COMPLETE CBC AUTOMATED: CPT | Performed by: FAMILY MEDICINE

## 2023-05-03 PROCEDURE — 80053 COMPREHEN METABOLIC PANEL: CPT | Performed by: FAMILY MEDICINE

## 2023-05-03 PROCEDURE — 3078F PR MOST RECENT DIASTOLIC BLOOD PRESSURE < 80 MM HG: ICD-10-PCS | Mod: CPTII,,, | Performed by: FAMILY MEDICINE

## 2023-05-03 PROCEDURE — 3008F PR BODY MASS INDEX (BMI) DOCUMENTED: ICD-10-PCS | Mod: CPTII,,, | Performed by: FAMILY MEDICINE

## 2023-05-03 PROCEDURE — 85651 RBC SED RATE NONAUTOMATED: CPT | Performed by: FAMILY MEDICINE

## 2023-05-03 PROCEDURE — 83615 LACTATE (LD) (LDH) ENZYME: CPT | Performed by: FAMILY MEDICINE

## 2023-05-03 PROCEDURE — 87389 HIV-1 AG W/HIV-1&-2 AB AG IA: CPT | Performed by: FAMILY MEDICINE

## 2023-05-03 PROCEDURE — 1159F MED LIST DOCD IN RCRD: CPT | Mod: CPTII,,, | Performed by: FAMILY MEDICINE

## 2023-05-03 PROCEDURE — 99214 PR OFFICE/OUTPT VISIT, EST, LEVL IV, 30-39 MIN: ICD-10-PCS | Mod: ,,, | Performed by: FAMILY MEDICINE

## 2023-05-03 PROCEDURE — 3078F DIAST BP <80 MM HG: CPT | Mod: CPTII,,, | Performed by: FAMILY MEDICINE

## 2023-05-03 RX ORDER — CEFDINIR 300 MG/1
300 CAPSULE ORAL 2 TIMES DAILY
Qty: 20 CAPSULE | Refills: 0 | Status: ON HOLD | OUTPATIENT
Start: 2023-05-03 | End: 2023-05-05

## 2023-05-03 RX ORDER — DOXYCYCLINE 100 MG/1
100 CAPSULE ORAL EVERY 12 HOURS
Qty: 20 CAPSULE | Refills: 0 | Status: ON HOLD | OUTPATIENT
Start: 2023-05-03 | End: 2023-05-05

## 2023-05-03 NOTE — PROGRESS NOTES
"SUBJECTIVE:  HPI    Leonides Rangel is a 22 y.o. male here for Follow-up (1 day ).  Here for follow-up as per yesterday's office note.  Last night, the patient awakened on several occasions with night sweats and severe dry mouth and coughing episodes.  His shortness of breath is definitely improved today.  However, he continues to have myalgias and some proximal muscle weakness.  He is not having any sputum production, diarrhea, documented fever in the last 24 hours, ulcers in the mouth, rash.    [place] allergies, medications, history, and problem list were updated as appropriate.    ROS:  Pertinent ROS as above, otherwise negative    OBJECTIVE:  Vital signs  Visit Vitals  BP 98/68 (BP Location: Right arm)   Pulse 110   Temp 97.7 °F (36.5 °C) (Temporal)   Ht 5' 8.31" (1.735 m)   Wt 85.8 kg (189 lb 3.2 oz)   SpO2 99%   BMI 28.51 kg/m²          PHYSICAL EXAM:  General: Awake, alert, no acute distress  ENT:  External auditory canals normal bilaterally .  Tympanic membranes normal bilaterally.  Oropharynx clear.    Neck:  No bruits, no masses  Cardiovascular:  Mild tachycardia, normal rate.  No murmurs.  Respiratory: Clear to auscultation bilaterally, normal effort.  He is not splinting today and is able to take a much deeper breath.  Abdomen: Soft, nontender, nondistended, no hepatosplenomegaly   Extremities:  No cyanosis, no clubbing, no edema  Skin:  No rashes or appreciable lesions   Musculoskeletal:  Tenderness to his arms and legs bilaterally    May 2, 2023:  White blood cell count 12.64, platelets 140, creatinine 0.82, alkaline phosphatase 189, , , CPK 55    ASSESSMENT/PLAN:  1. Pneumonia of left lower lobe due to infectious organism    2. Thrombocytopenia    3. Elevated liver enzymes      I am again perplexed today as to the exact etiology of his underlying symptoms.  Mononucleosis is certainly in the differential diagnosis as well as acute HIV infection.  However, I can not say that I have seen " a consolidated pneumonia in the setting of mononucleosis in the past.  Additionally, he does not really have any HIV risk factors.      His chest x-ray is definitely better.  Clinically he seems somewhat better but I am still a little concerned because of his persistent tachycardia and myositis findings.    We will repeat a CBC, CMP today with an LDH, CRP, sed rate, EBV and CMV titers and HIV test.    We will also go ahead and discontinue his Levaquin in case that is what is contributing to his myositis findings.  Because of his leukocytosis, we will change to cefdinir and doxycycline.    We will see him back again tomorrow for clinical follow-up and exam.

## 2023-05-04 ENCOUNTER — HOSPITAL ENCOUNTER (OUTPATIENT)
Facility: HOSPITAL | Age: 23
Discharge: HOME | End: 2023-05-05
Attending: EMERGENCY MEDICINE | Admitting: INTERNAL MEDICINE
Payer: COMMERCIAL

## 2023-05-04 ENCOUNTER — OFFICE VISIT (OUTPATIENT)
Dept: FAMILY MEDICINE | Facility: CLINIC | Age: 23
End: 2023-05-04
Payer: COMMERCIAL

## 2023-05-04 VITALS
HEIGHT: 68 IN | BODY MASS INDEX: 28.61 KG/M2 | WEIGHT: 188.81 LBS | SYSTOLIC BLOOD PRESSURE: 100 MMHG | OXYGEN SATURATION: 97 % | TEMPERATURE: 100 F | DIASTOLIC BLOOD PRESSURE: 68 MMHG | HEART RATE: 101 BPM

## 2023-05-04 DIAGNOSIS — M60.09 INFECTIVE MYOSITIS OF MULTIPLE SITES: ICD-10-CM

## 2023-05-04 DIAGNOSIS — R05.9 COUGH: ICD-10-CM

## 2023-05-04 DIAGNOSIS — J18.9 PNEUMONIA OF LEFT LUNG DUE TO INFECTIOUS ORGANISM, UNSPECIFIED PART OF LUNG: Primary | ICD-10-CM

## 2023-05-04 DIAGNOSIS — J18.9 PNEUMONIA OF LEFT LOWER LOBE DUE TO INFECTIOUS ORGANISM: Primary | ICD-10-CM

## 2023-05-04 DIAGNOSIS — K75.9 HEPATITIS: ICD-10-CM

## 2023-05-04 DIAGNOSIS — B30.9 ACUTE VIRAL CONJUNCTIVITIS OF BOTH EYES: ICD-10-CM

## 2023-05-04 DIAGNOSIS — D69.6 THROMBOCYTOPENIA: ICD-10-CM

## 2023-05-04 DIAGNOSIS — R07.9 CHEST PAIN: ICD-10-CM

## 2023-05-04 DIAGNOSIS — I31.9 PERICARDITIS: ICD-10-CM

## 2023-05-04 PROBLEM — R74.8 ELEVATED LIVER ENZYMES: Status: RESOLVED | Noted: 2023-05-02 | Resolved: 2023-05-04

## 2023-05-04 PROBLEM — B34.9 ACUTE VIRAL DISEASE: Status: ACTIVE | Noted: 2023-05-04

## 2023-05-04 LAB
ABS NEUT (OLG): 8.24 X10(3)/MCL (ref 2.1–9.2)
ALBUMIN SERPL-MCNC: 2.9 G/DL (ref 3.5–5)
ALBUMIN/GLOB SERPL: 0.8 RATIO (ref 1.1–2)
ALP SERPL-CCNC: 158 UNIT/L (ref 40–150)
ALT SERPL-CCNC: 158 UNIT/L (ref 0–55)
AST SERPL-CCNC: 85 UNIT/L (ref 5–34)
B PERT.PT PRMT NPH QL NAA+NON-PROBE: NOT DETECTED
BASOPHILS NFR BLD MANUAL: 0.14 X10(3)/MCL (ref 0–0.2)
BASOPHILS NFR BLD MANUAL: 1 %
BILIRUBIN DIRECT+TOT PNL SERPL-MCNC: 0.6 MG/DL
BUN SERPL-MCNC: 10.5 MG/DL (ref 8.9–20.6)
C PNEUM DNA NPH QL NAA+NON-PROBE: NOT DETECTED
CALCIUM SERPL-MCNC: 8.6 MG/DL (ref 8.4–10.2)
CHLORIDE SERPL-SCNC: 109 MMOL/L (ref 98–107)
CK SERPL-CCNC: 56 U/L (ref 30–200)
CMV IGG SERPL QL IA: NEGATIVE
CMV IGM SERPL QL IA: NEGATIVE
CO2 SERPL-SCNC: 24 MMOL/L (ref 22–29)
CREAT SERPL-MCNC: 0.9 MG/DL (ref 0.73–1.18)
EBV NA AB SER QL: POSITIVE
EBV VCA IGG SER QL: POSITIVE
EBV VCA IGM SER QL: NEGATIVE
EOSINOPHIL NFR BLD MANUAL: 0.14 X10(3)/MCL (ref 0–0.9)
EOSINOPHIL NFR BLD MANUAL: 1 %
ERYTHROCYTE [DISTWIDTH] IN BLOOD BY AUTOMATED COUNT: 14.7 % (ref 11.5–17)
ERYTHROCYTE [SEDIMENTATION RATE] IN BLOOD: 21 MM/HR (ref 0–15)
GFR SERPLBLD CREATININE-BSD FMLA CKD-EPI: >60 MLS/MIN/1.73/M2
GLOBULIN SER-MCNC: 3.7 GM/DL (ref 2.4–3.5)
GLUCOSE SERPL-MCNC: 84 MG/DL (ref 74–100)
HADV DNA NPH QL NAA+NON-PROBE: NOT DETECTED
HCOV 229E RNA NPH QL NAA+NON-PROBE: NOT DETECTED
HCOV HKU1 RNA NPH QL NAA+NON-PROBE: NOT DETECTED
HCOV NL63 RNA NPH QL NAA+NON-PROBE: NOT DETECTED
HCOV OC43 RNA NPH QL NAA+NON-PROBE: NOT DETECTED
HCT VFR BLD AUTO: 40.4 % (ref 42–52)
HGB BLD-MCNC: 13.3 G/DL (ref 14–18)
HIV 1+2 AB+HIV1 P24 AG SERPL QL IA: NONREACTIVE
HMPV RNA NPH QL NAA+NON-PROBE: NOT DETECTED
HPIV1 RNA NPH QL NAA+NON-PROBE: NOT DETECTED
HPIV2 RNA NPH QL NAA+NON-PROBE: NOT DETECTED
HPIV3 RNA NPH QL NAA+NON-PROBE: NOT DETECTED
HPIV4 RNA NPH QL NAA+NON-PROBE: NOT DETECTED
IMMUNOLOGIST REVIEW: NORMAL
INSTRUMENT WBC (OLG): 13.73 X10(3)/MCL
LACTATE SERPL-SCNC: 1 MMOL/L (ref 0.5–2.2)
LIPASE SERPL-CCNC: 50 U/L
LYMPHOCYTES NFR BLD MANUAL: 27 %
LYMPHOCYTES NFR BLD MANUAL: 3.71 X10(3)/MCL
M PNEUMO DNA NPH QL NAA+NON-PROBE: NOT DETECTED
MCH RBC QN AUTO: 28.4 PG (ref 27–31)
MCHC RBC AUTO-ENTMCNC: 32.9 G/DL (ref 33–36)
MCV RBC AUTO: 86.1 FL (ref 80–94)
METAMYELOCYTES NFR BLD MANUAL: 4 %
MONOCYTES NFR BLD MANUAL: 1.65 X10(3)/MCL (ref 0.1–1.3)
MONOCYTES NFR BLD MANUAL: 12 %
NEUTROPHILS NFR BLD MANUAL: 56 %
NRBC BLD AUTO-RTO: 0 %
PLATELET # BLD AUTO: 251 X10(3)/MCL (ref 130–400)
PLATELET # BLD EST: NORMAL 10*3/UL
PMV BLD AUTO: 10.5 FL (ref 7.4–10.4)
POTASSIUM SERPL-SCNC: 3.5 MMOL/L (ref 3.5–5.1)
PROT SERPL-MCNC: 6.6 GM/DL (ref 6.4–8.3)
RBC # BLD AUTO: 4.69 X10(6)/MCL (ref 4.7–6.1)
RBC MORPH BLD: NORMAL
RSV RNA NPH QL NAA+NON-PROBE: NOT DETECTED
RV+EV RNA NPH QL NAA+NON-PROBE: NOT DETECTED
SODIUM SERPL-SCNC: 140 MMOL/L (ref 136–145)
WBC # SPEC AUTO: 13.73 X10(3)/MCL (ref 4.5–11.5)

## 2023-05-04 PROCEDURE — 96366 THER/PROPH/DIAG IV INF ADDON: CPT

## 2023-05-04 PROCEDURE — 3008F BODY MASS INDEX DOCD: CPT | Mod: CPTII,,, | Performed by: FAMILY MEDICINE

## 2023-05-04 PROCEDURE — 3008F PR BODY MASS INDEX (BMI) DOCUMENTED: ICD-10-PCS | Mod: CPTII,,, | Performed by: FAMILY MEDICINE

## 2023-05-04 PROCEDURE — 96365 THER/PROPH/DIAG IV INF INIT: CPT | Mod: 59

## 2023-05-04 PROCEDURE — 87798 DETECT AGENT NOS DNA AMP: CPT | Performed by: EMERGENCY MEDICINE

## 2023-05-04 PROCEDURE — 85027 COMPLETE CBC AUTOMATED: CPT | Performed by: PHYSICIAN ASSISTANT

## 2023-05-04 PROCEDURE — 25500020 PHARM REV CODE 255: Performed by: EMERGENCY MEDICINE

## 2023-05-04 PROCEDURE — 83605 ASSAY OF LACTIC ACID: CPT | Performed by: EMERGENCY MEDICINE

## 2023-05-04 PROCEDURE — 96361 HYDRATE IV INFUSION ADD-ON: CPT

## 2023-05-04 PROCEDURE — 99214 OFFICE O/P EST MOD 30 MIN: CPT | Mod: ,,, | Performed by: FAMILY MEDICINE

## 2023-05-04 PROCEDURE — G0378 HOSPITAL OBSERVATION PER HR: HCPCS

## 2023-05-04 PROCEDURE — 1159F MED LIST DOCD IN RCRD: CPT | Mod: CPTII,,, | Performed by: FAMILY MEDICINE

## 2023-05-04 PROCEDURE — 96367 TX/PROPH/DG ADDL SEQ IV INF: CPT

## 2023-05-04 PROCEDURE — 85025 COMPLETE CBC W/AUTO DIFF WBC: CPT | Performed by: PHYSICIAN ASSISTANT

## 2023-05-04 PROCEDURE — 3074F SYST BP LT 130 MM HG: CPT | Mod: CPTII,,, | Performed by: FAMILY MEDICINE

## 2023-05-04 PROCEDURE — 99285 EMERGENCY DEPT VISIT HI MDM: CPT | Mod: 25

## 2023-05-04 PROCEDURE — 82550 ASSAY OF CK (CPK): CPT | Performed by: EMERGENCY MEDICINE

## 2023-05-04 PROCEDURE — 63600175 PHARM REV CODE 636 W HCPCS: Performed by: EMERGENCY MEDICINE

## 2023-05-04 PROCEDURE — 83690 ASSAY OF LIPASE: CPT | Performed by: EMERGENCY MEDICINE

## 2023-05-04 PROCEDURE — 80053 COMPREHEN METABOLIC PANEL: CPT | Performed by: PHYSICIAN ASSISTANT

## 2023-05-04 PROCEDURE — 99214 PR OFFICE/OUTPT VISIT, EST, LEVL IV, 30-39 MIN: ICD-10-PCS | Mod: ,,, | Performed by: FAMILY MEDICINE

## 2023-05-04 PROCEDURE — 3078F PR MOST RECENT DIASTOLIC BLOOD PRESSURE < 80 MM HG: ICD-10-PCS | Mod: CPTII,,, | Performed by: FAMILY MEDICINE

## 2023-05-04 PROCEDURE — 1159F PR MEDICATION LIST DOCUMENTED IN MEDICAL RECORD: ICD-10-PCS | Mod: CPTII,,, | Performed by: FAMILY MEDICINE

## 2023-05-04 PROCEDURE — 87040 BLOOD CULTURE FOR BACTERIA: CPT | Mod: 91 | Performed by: EMERGENCY MEDICINE

## 2023-05-04 PROCEDURE — 3074F PR MOST RECENT SYSTOLIC BLOOD PRESSURE < 130 MM HG: ICD-10-PCS | Mod: CPTII,,, | Performed by: FAMILY MEDICINE

## 2023-05-04 PROCEDURE — 25000003 PHARM REV CODE 250: Performed by: EMERGENCY MEDICINE

## 2023-05-04 PROCEDURE — 3078F DIAST BP <80 MM HG: CPT | Mod: CPTII,,, | Performed by: FAMILY MEDICINE

## 2023-05-04 RX ORDER — SODIUM CHLORIDE 9 MG/ML
500 INJECTION, SOLUTION INTRAVENOUS
Status: COMPLETED | OUTPATIENT
Start: 2023-05-04 | End: 2023-05-04

## 2023-05-04 RX ORDER — MAG HYDROX/ALUMINUM HYD/SIMETH 200-200-20
30 SUSPENSION, ORAL (FINAL DOSE FORM) ORAL 4 TIMES DAILY PRN
Status: DISCONTINUED | OUTPATIENT
Start: 2023-05-05 | End: 2023-05-05 | Stop reason: HOSPADM

## 2023-05-04 RX ORDER — ACETAMINOPHEN 500 MG
1000 TABLET ORAL EVERY 6 HOURS PRN
Status: DISCONTINUED | OUTPATIENT
Start: 2023-05-05 | End: 2023-05-05 | Stop reason: HOSPADM

## 2023-05-04 RX ORDER — SODIUM CHLORIDE 0.9 % (FLUSH) 0.9 %
10 SYRINGE (ML) INJECTION
Status: DISCONTINUED | OUTPATIENT
Start: 2023-05-05 | End: 2023-05-05 | Stop reason: HOSPADM

## 2023-05-04 RX ORDER — ONDANSETRON 2 MG/ML
4 INJECTION INTRAMUSCULAR; INTRAVENOUS EVERY 4 HOURS PRN
Status: DISCONTINUED | OUTPATIENT
Start: 2023-05-05 | End: 2023-05-05 | Stop reason: HOSPADM

## 2023-05-04 RX ORDER — ACETAMINOPHEN 325 MG/1
650 TABLET ORAL EVERY 4 HOURS PRN
Status: DISCONTINUED | OUTPATIENT
Start: 2023-05-05 | End: 2023-05-05 | Stop reason: HOSPADM

## 2023-05-04 RX ORDER — VANCOMYCIN HCL IN 5 % DEXTROSE 1G/250ML
25 PLASTIC BAG, INJECTION (ML) INTRAVENOUS
Status: COMPLETED | OUTPATIENT
Start: 2023-05-04 | End: 2023-05-04

## 2023-05-04 RX ORDER — PROCHLORPERAZINE EDISYLATE 5 MG/ML
5 INJECTION INTRAMUSCULAR; INTRAVENOUS EVERY 6 HOURS PRN
Status: DISCONTINUED | OUTPATIENT
Start: 2023-05-05 | End: 2023-05-05 | Stop reason: HOSPADM

## 2023-05-04 RX ORDER — TALC
6 POWDER (GRAM) TOPICAL NIGHTLY PRN
Status: DISCONTINUED | OUTPATIENT
Start: 2023-05-05 | End: 2023-05-05 | Stop reason: HOSPADM

## 2023-05-04 RX ORDER — ENOXAPARIN SODIUM 100 MG/ML
40 INJECTION SUBCUTANEOUS EVERY 24 HOURS
Status: DISCONTINUED | OUTPATIENT
Start: 2023-05-05 | End: 2023-05-05 | Stop reason: HOSPADM

## 2023-05-04 RX ORDER — POLYETHYLENE GLYCOL 3350 17 G/17G
17 POWDER, FOR SOLUTION ORAL 2 TIMES DAILY PRN
Status: DISCONTINUED | OUTPATIENT
Start: 2023-05-05 | End: 2023-05-05 | Stop reason: HOSPADM

## 2023-05-04 RX ORDER — AMOXICILLIN 250 MG
2 CAPSULE ORAL 2 TIMES DAILY PRN
Status: DISCONTINUED | OUTPATIENT
Start: 2023-05-05 | End: 2023-05-05 | Stop reason: HOSPADM

## 2023-05-04 RX ADMIN — IOPAMIDOL 100 ML: 755 INJECTION, SOLUTION INTRAVENOUS at 04:05

## 2023-05-04 RX ADMIN — VANCOMYCIN HYDROCHLORIDE 2000 MG: 1 INJECTION, POWDER, LYOPHILIZED, FOR SOLUTION INTRAVENOUS at 06:05

## 2023-05-04 RX ADMIN — PIPERACILLIN AND TAZOBACTAM 4.5 G: 4; .5 INJECTION, POWDER, LYOPHILIZED, FOR SOLUTION INTRAVENOUS; PARENTERAL at 05:05

## 2023-05-04 RX ADMIN — SODIUM CHLORIDE 500 ML: 9 INJECTION, SOLUTION INTRAVENOUS at 04:05

## 2023-05-04 NOTE — PROGRESS NOTES
"SUBJECTIVE:  HPI    Leonides Rangel is a 22 y.o. male here for Follow-up (1 day pneumonia).  He returns today for 1 day follow-up.  He feels miserable today.  He woke up today with bilateral red and crusty eyes.  He continues to have severe myalgias.  His cough has also returned today.  He has severe general malaise and fatigue.    His wife is having very similar symptoms.  They recently found a flea infestation in their home and spray for fleas just prior to the onset other symptoms.    Leonides's allergies, medications, history, and problem list were updated as appropriate.  Also, reviewed lab results trends.    ROS:  Pertinent ROS as above, otherwise negative    OBJECTIVE:  Vital signs  Visit Vitals  /68 (BP Location: Left arm)   Pulse 101   Temp 100.1 °F (37.8 °C) (Oral)   Ht 5' 8.31" (1.735 m)   Wt 85.6 kg (188 lb 12.8 oz)   SpO2 97%   BMI 28.45 kg/m²          PHYSICAL EXAM:  General:  Awake, alert, no acute distress   HEENT:  Bilateral conjunctival injection with erythema and crusty mucopurulent drainage  Neck:  No lymphadenopathy  Cardiovascular:  Tachycardic, no murmurs.  Respiratory:  Shallow depth of inspiration today with diminished breath sounds in the bases but no appreciable crackles or rhonchi  Abdomen: Soft, very mild hepatomegaly with tenderness in the right upper quadrant but no guarding or rebound  Extremities:  No peripheral edema, no cyanosis  Skin: No rashes  Musculoskeletal: Tenderness to palpation over the muscles of the arms and the legs      5/3/23:  ESR 21, CRP 3.8,    HIV, mono studies including CMV and EBV pending  , , alkaline phosphatase 206, , white blood cell count 16.1, platelet count 214    ASSESSMENT/PLAN:  Leonides was seen today for follow-up.    Diagnoses and all orders for this visit:    Pneumonia of left lower lobe due to infectious organism    Hepatitis    Infective myositis of multiple sites    Acute viral conjunctivitis of both " eyes    Thrombocytopenia       At this point, I suspect that the most likely underlying diagnosis is adenovirus.  However, other potential etiologies could be toxic in nature or autoimmune.  His wife having similar symptoms argues in favor of a viral etiology.    The patient is having severe symptoms and is struggling to get up out of the chair today and I believe he needs a definitive diagnosis.  He will benefit from intravenous steroids, close monitoring and further diagnostic studies.  I would like for him to go to a medical center that has specialist care available for a definitive diagnosis and treatment.  The patient's mother will bring him to [facility].

## 2023-05-04 NOTE — ED PROVIDER NOTES
Encounter Date: 5/4/2023    SCRIBE #1 NOTE: I, Randal Crenshaw, am scribing for, and in the presence of,  Dr. Oconnor. I have scribed the following portions of the note - Other sections scribed: HPI, ROS, Physical Exam, MDM, Attending.     History     Chief Complaint   Patient presents with    Fever    Cough     Flu like symptoms since 4/24, diagnosed with flu. Reports symptoms worsening, sent to er by PCP to rule out pneumonia     21 y/o male presents to ED for intermittent fevers, Tmax of 104F, onset 4/24/23.  Patient was flu positive 10 days ago and has been seen in ED twice since then, diagnosed with pneumonia, hepatitis and UTI.  He was started on levaquin. After 3 days, he was still having fevers, not getting any better, seen by his PCP daily for the past 2 days, antibiotics changed to doxycycline and cefdinir.  He is still not improving, had fever 100.1F today with his PCP, sent to the ED for evaluation. Pt complains of chest pain discomfort, epigastric pain, HA, joint pain/body aches, decreased appetite, diarrhea, night sweats and generalized weakness as well.  He says the weakness has improved some, but overall he is not feeling better.  Pt was told to come here for ID consultation by his PCP.      He adds that he was told his WBC count has been increasing.  He also says his wife has been sick recently with similar symptoms as well.  He does not use IV drugs and has not been exposed to any infectious agents to his knowledge.  Pt denies rash, dysuria, vomiting or joint swelling. He works at the 's office, off the past month to be home after the birth of his child.    The history is provided by the patient.   Fever  Primary symptoms of the febrile illness include fever, fatigue, headaches, cough, abdominal pain (epigastric), diarrhea, myalgias and arthralgias. Primary symptoms do not include shortness of breath, vomiting, dysuria or rash. Episode onset: 10 days ago. This is a new problem.   The maximum  temperature recorded prior to his arrival was 103 to 104 F.   The fatigue began more than 1 week ago. The fatigue has been unchanged since its onset.   The headache began more than 2 days ago. Headache is a new problem. The headache is not associated with photophobia, neck stiffness or weakness.   The cough began more than 1 week ago.   The abdominal pain began more than 2 days ago. The abdominal pain has been unchanged since its onset. The abdominal pain is located in the epigastric region.   The diarrhea began more than 1 week ago.   Myalgias began more than 1 week ago. The myalgias have been unchanged since their onset. The myalgias are generalized. The myalgias are not associated with weakness or swelling.   Arthralgias began more than 1 week ago. The arthralgias are not associated with joint swelling.   Cough  Associated symptoms include chest pain, headaches and myalgias. Pertinent negatives include no sore throat and no shortness of breath.   Review of patient's allergies indicates:  No Known Allergies  No past medical history on file.  Past Surgical History:   Procedure Laterality Date    CHOLECYSTECTOMY      Foreign body removal arm      ORIF FINGER FRACTURE       No family history on file.  Social History     Tobacco Use    Smoking status: Never    Smokeless tobacco: Never   Substance Use Topics    Alcohol use: Not Currently    Drug use: Never     Review of Systems   Constitutional:  Positive for appetite change, fatigue and fever.        Night sweats  Generalized weakness   HENT:  Negative for sinus pressure, sinus pain and sore throat.    Eyes:  Negative for photophobia.   Respiratory:  Positive for cough. Negative for shortness of breath.    Cardiovascular:  Positive for chest pain.   Gastrointestinal:  Positive for abdominal pain (epigastric) and diarrhea. Negative for vomiting.   Genitourinary:  Negative for dysuria and frequency.   Musculoskeletal:  Positive for arthralgias and myalgias. Negative for  joint swelling and neck stiffness.   Skin:  Negative for rash.   Neurological:  Positive for headaches. Negative for weakness, light-headedness and numbness.     Physical Exam     Initial Vitals [05/04/23 1131]   BP Pulse Resp Temp SpO2   128/77 98 18 98.1 °F (36.7 °C) 99 %      MAP       --         Physical Exam    Nursing note and vitals reviewed.  Constitutional: He appears well-developed and well-nourished. He is not diaphoretic. No distress.   HENT:   Head: Normocephalic and atraumatic.   Dry lips   Eyes: Conjunctivae and EOM are normal. Pupils are equal, round, and reactive to light. No scleral icterus.   Neck: Neck supple.   No meningismus   Normal range of motion.  Cardiovascular:  Normal rate, regular rhythm and intact distal pulses.           Pulmonary/Chest: Breath sounds normal. No respiratory distress. He has no wheezes. He has no rhonchi. He has no rales.   Abdominal: Abdomen is soft. Bowel sounds are normal. There is abdominal tenderness (mild epigastric/LUQ). There is no rebound and no guarding.   Musculoskeletal:         General: No tenderness or edema. Normal range of motion.      Cervical back: Normal range of motion and neck supple.      Lumbar back: Normal.      Comments: No obvious erythema or warmth about all major joints, patient is able to ambulate     Neurological: He is alert and oriented to person, place, and time. He has normal strength. No cranial nerve deficit or sensory deficit. GCS score is 15. GCS eye subscore is 4. GCS verbal subscore is 5. GCS motor subscore is 6.   Skin: Skin is warm, dry and intact. Capillary refill takes less than 2 seconds. No rash noted.   Psychiatric: He has a normal mood and affect.       ED Course   Procedures  Labs Reviewed   COMPREHENSIVE METABOLIC PANEL - Abnormal; Notable for the following components:       Result Value    Chloride 109 (*)     Albumin Level 2.9 (*)     Globulin 3.7 (*)     Albumin/Globulin Ratio 0.8 (*)     Alkaline Phosphatase 158 (*)      Alanine Aminotransferase 158 (*)     Aspartate Aminotransferase 85 (*)     All other components within normal limits   CBC WITH DIFFERENTIAL - Abnormal; Notable for the following components:    WBC 13.73 (*)     RBC 4.69 (*)     Hgb 13.3 (*)     Hct 40.4 (*)     MCHC 32.9 (*)     MPV 10.5 (*)     All other components within normal limits   MANUAL DIFFERENTIAL - Abnormal; Notable for the following components:    Abs Mono 1.6476 (*)     All other components within normal limits   LACTIC ACID, PLASMA - Normal   LIPASE - Normal   CK - Normal   BLOOD CULTURE [facility]   BLOOD CULTURE [facility]   RESPIRATORY CULTURE ([facility])   CBC W/ AUTO DIFFERENTIAL    Narrative:     The following orders were created for panel order CBC auto differential.  Procedure                               Abnormality         Status                     ---------                               -----------         ------                     CBC with Differential[[id number]]        Abnormal            Final result               Manual Differential[[id number]]          Abnormal            Final result                 Please view results for these tests on the individual orders.   RESPIRATORY PANEL          Imaging Results              CT Chest With Contrast (Final result)  Result time 05/04/23 17:05:17      Final result by Jeffrey Smyth MD (05/04/23 17:05:17)                   Impression:      1. Small pleural and pericardial effusions.  Areas of mild atelectasis adjacent to the pleural fluid.  2. Borderline splenomegaly.      Electronically signed by: Jeffrey Smyth  Date:    05/04/2023  Time:    17:05               Narrative:    EXAMINATION:  CT CHEST WITH CONTRAST    CLINICAL HISTORY:  Pneumonia, unresolved;    TECHNIQUE:  Contiguous CT images of the chest after IV contrast administration. Axial, coronal and sagittal reformatted images reviewed. Dose length product is 308 mGycm. Automatic exposure control was utilized to limit radiation  dose.    COMPARISON:  No relevant comparison studies available at the time of dictation.    FINDINGS:  Lungs and large airways: Mild dependent atelectasis bilaterally.    Pleura: Small bilateral pleural effusions.    Mediastinum and suellen: Normal heart size and thoracic aortic caliber.  Small pericardial effusion.  No pathologically enlarged lymph node.    Chest wall/axilla and lower neck: No significant findings.    Upper abdomen: Prior cholecystectomy.  Borderline splenomegaly.    Bones: No acute osseous findings.                                       US Abdomen Limited (Final result)  Result time 05/04/23 13:49:33      Final result by Jeffrey Smyth MD (05/04/23 13:49:33)                   Impression:      No sonographic abnormality identified.      Electronically signed by: Jeffrey Smyth  Date:    05/04/2023  Time:    13:49               Narrative:    EXAMINATION:  US ABDOMEN LIMITED    CLINICAL HISTORY:  epigastric pain;    TECHNIQUE:  Gray-scale and color Doppler ultrasound images of the abdomen.    COMPARISON:  Ultrasound 11/28/2016    FINDINGS:  Pancreas partially obscured with no abnormality appreciated as imaged.  No significant findings regarding the visualized abdominal aorta and IVC.    Liver has normal size and echogenicity.  Main portal vein patent with normal flow direction.  Gallbladder not seen with reported prior cholecystectomy.  No biliary dilatation or ascites.    No hydronephrosis or defined calcification in the right kidney.    Measurements:    - Liver: 16.5 cm    - CBD diameter: 4 mm    - Right kidney: 9.8 cm in length                                       X-Ray Chest PA And Lateral (Final result)  Result time 05/04/23 11:46:12      Final result by Ulices Pollock MD (05/04/23 11:46:12)                   Impression:      Left lower lung zone minimal infiltrates and minimal pleural effusion, unchanged      Electronically signed by: Ulices Pollock  Date:    05/04/2023  Time:    11:46                Narrative:    EXAMINATION:  XR CHEST PA AND LATERAL    CLINICAL HISTORY:  Cough, unspecified    TECHNIQUE:  Two-view    COMPARISON:  May 2, 2023..    FINDINGS:  Cardiopericardial silhouette is within normal limits.  There are minimal left lower lung zone infiltrates and associated pleural effusion which is better visualized on the lateral projection and are without significant interval difference.  No new airspace opacities identified.  There is no pneumothorax.                                    X-Rays:   Independently Interpreted Readings:   Chest X-Ray: LLL infiltrate   Medications   vancomycin in dextrose 5 % 1 gram/250 mL IVPB 2,000 mg (has no administration in time range)   piperacillin-tazobactam (ZOSYN) 4.5 g in dextrose 5 % in water (D5W) 5 % 100 mL IVPB (MB+) (has no administration in time range)   0.9%  NaCl infusion (500 mLs Intravenous New Bag 5/4/23 1600)   iopamidoL (ISOVUE-370) injection 100 mL (100 mLs Intravenous Given 5/4/23 1649)     Medical Decision Making:   Initial Assessment:   22-year-old male presents from his PCPs office for evaluation of continued fever despite antibiotic therapy for pneumonia. He reportedly does not feel any better since being on antibiotics for the past 4 days and outpatient labs continue to worsen. He has been to the ED twice and his PCP twice with no change. Other than appearing volume depleted and having mild epigastric TTP, his exam is non-focal.  Outpatient labs reviewed, significant for leukocytosis, transaminitis, negative HIV, negative Legionella, negative EBV IgM, positive IgG. Repeat labs and chest x-ray ordered here.  Reassess  Differential Diagnosis:   Viral syndrome, pneumonia with failed outpatient therapy, rheumatologic disorder, malignancy, abscess and others  Independently Interpreted Test(s):   I have ordered and independently interpreted X-rays - see prior notes.  Clinical Tests:   Lab Tests: Ordered and Reviewed  Radiological Study: Ordered and  Reviewed  Other:   I have discussed this case with another health care provider.        Scribe Attestation:   Scribe #1: I performed the above scribed service and the documentation accurately describes the services I performed. I attest to the accuracy of the note.    Attending Attestation:           Physician Attestation for Scribe:  Physician Attestation Statement for Scribe #1: I, reviewed documentation, as scribed by Randal Crenshaw in my presence, and it is both accurate and complete.           ED Course as of 05/04/23 1708   Thu May 04, 2023   1411 Reexamine.  Patient is feeling fine, no acute complaints.  I am attempting to contact his PCP and Infectious Disease at this time. [RB]   1500 Patient states he is feeling improved.  Again he is ambulatory.  He has not had fever here.  Labs still with a leukocytosis that has improved since yesterday, transaminitis also improved since yesterday.  Chest x-ray still showing a left lower lobe infiltrate that is unchanged.  Lactic acid is 1.  Blood cultures are pending.  I contacted the patient's PCP who tells me he is concerned that the patient continues to have fever despite being on antibiotic therapy for 4 days.  He is not certain what else to do at this point which is why he sent him to the hospital for inpatient evaluation by Infectious Disease.  He tells me the patient was unable to walk he was so weak several days ago.  I will add a CK.  He also believes the influenza swab was a false positive.  I will add sputum cultures and a respiratory panel, CT of the chest to assess for abscess or other abnormality and consult Hospital Medicine for admission. [RB]   2725 I have still not been able to speak to Dr. RAO with Infectious Disease.  Hospital Medicine has accepted the patient for admission for IV antibiotics and continued evaluation.  Dr. RAO will be consulted in house. [RB]      ED Course User Index  [RB] Clara Oconnor MD                 Clinical Impression:   Final  diagnoses:  [R05.9] Cough  [J18.9] Pneumonia of left lung due to infectious organism, unspecified part of lung (Primary)        ED Disposition Condition    Observation Stable                Clara Oconnor MD  05/04/23 8993

## 2023-05-04 NOTE — FIRST PROVIDER EVALUATION
Medical screening examination initiated.  I have conducted a focused provider triage encounter, findings are as follows:    Chief Complaint   Patient presents with    Fever    Cough     Flu like symptoms since 4/24, diagnosed with flu. Reports symptoms worsening, sent to er by PCP to rule out pneumonia         Brief history of present illness:  22 y.o. male presents to the E.D. with c/o flu like symptoms intermittently since 4/24/23. Patient went to PCP, Dr. Soriano for several days in a row and was sent here for further evaluation as patient reports his symptoms are worsening.    Vitals:    05/04/23 1131   BP: 128/77   BP Location: Left arm   Patient Position: Sitting   Pulse: 98   Resp: 18   Temp: 98.1 °F (36.7 °C)   TempSrc: Oral   SpO2: 99%       Pertinent physical exam:  Awake, Alert, Oriented, Non labored breathing       Brief workup plan:  cxr, labs     Preliminary workup initiated; this workup will be continued and followed by the physician or advanced practice provider that is assigned to the patient when roomed.

## 2023-05-05 VITALS
RESPIRATION RATE: 18 BRPM | HEIGHT: 68 IN | DIASTOLIC BLOOD PRESSURE: 85 MMHG | WEIGHT: 189.81 LBS | OXYGEN SATURATION: 99 % | SYSTOLIC BLOOD PRESSURE: 134 MMHG | TEMPERATURE: 98 F | HEART RATE: 74 BPM | BODY MASS INDEX: 28.77 KG/M2

## 2023-05-05 LAB
ABS NEUT (OLG): 6.5 X10(3)/MCL (ref 2.1–9.2)
ALBUMIN SERPL-MCNC: 2.7 G/DL (ref 3.5–5)
ALBUMIN/GLOB SERPL: 0.9 RATIO (ref 1.1–2)
ALP SERPL-CCNC: 135 UNIT/L (ref 40–150)
ALT SERPL-CCNC: 123 UNIT/L (ref 0–55)
AST SERPL-CCNC: 60 UNIT/L (ref 5–34)
AV INDEX (PROSTH): 0.78
AV MEAN GRADIENT: 4 MMHG
AV PEAK GRADIENT: 8 MMHG
AV VALVE AREA: 2.45 CM2
AV VELOCITY RATIO: 0.84
BILIRUBIN DIRECT+TOT PNL SERPL-MCNC: 0.7 MG/DL
BSA FOR ECHO PROCEDURE: 2.03 M2
BUN SERPL-MCNC: 9.4 MG/DL (ref 8.9–20.6)
CALCIUM SERPL-MCNC: 8.2 MG/DL (ref 8.4–10.2)
CHLORIDE SERPL-SCNC: 106 MMOL/L (ref 98–107)
CO2 SERPL-SCNC: 27 MMOL/L (ref 22–29)
CREAT SERPL-MCNC: 0.93 MG/DL (ref 0.73–1.18)
CRP SERPL-MCNC: 21.7 MG/L
CV ECHO LV RWT: 0.29 CM
DOP CALC AO PEAK VEL: 1.38 M/S
DOP CALC AO VTI: 24.6 CM
DOP CALC LVOT AREA: 3.1 CM2
DOP CALC LVOT DIAMETER: 2 CM
DOP CALC LVOT PEAK VEL: 1.16 M/S
DOP CALC LVOT STROKE VOLUME: 60.29 CM3
DOP CALC MV VTI: 28.2 CM
DOP CALCLVOT PEAK VEL VTI: 19.2 CM
E WAVE DECELERATION TIME: 217 MSEC
E/A RATIO: 1.56
E/E' RATIO: 8.1 M/S
ECHO LV POSTERIOR WALL: 0.71 CM (ref 0.6–1.1)
EJECTION FRACTION: 60 %
EOSINOPHIL NFR BLD MANUAL: 0.31 X10(3)/MCL (ref 0–0.9)
EOSINOPHIL NFR BLD MANUAL: 3 %
ERYTHROCYTE [DISTWIDTH] IN BLOOD BY AUTOMATED COUNT: 14.6 % (ref 11.5–17)
ERYTHROCYTE [SEDIMENTATION RATE] IN BLOOD: 26 MM/HR (ref 0–15)
FLUAV AG UPPER RESP QL IA.RAPID: NOT DETECTED
FLUBV AG UPPER RESP QL IA.RAPID: NOT DETECTED
FRACTIONAL SHORTENING: 30 % (ref 28–44)
GFR SERPLBLD CREATININE-BSD FMLA CKD-EPI: >60 MLS/MIN/1.73/M2
GLOBULIN SER-MCNC: 3.1 GM/DL (ref 2.4–3.5)
GLUCOSE SERPL-MCNC: 94 MG/DL (ref 74–100)
HAV IGM SERPL QL IA: NONREACTIVE
HBV CORE IGM SERPL QL IA: NONREACTIVE
HBV SURFACE AG SERPL QL IA: NONREACTIVE
HCT VFR BLD AUTO: 37.5 % (ref 42–52)
HCV AB SERPL QL IA: NONREACTIVE
HEMATOLOGIST REVIEW: NORMAL
HGB BLD-MCNC: 12.2 G/DL (ref 14–18)
HIV 1+2 AB+HIV1 P24 AG SERPL QL IA: NONREACTIVE
INSTRUMENT WBC (OLG): 10.32 X10(3)/MCL
INTERVENTRICULAR SEPTUM: 0.68 CM (ref 0.6–1.1)
LEFT ATRIUM SIZE: 2.7 CM
LEFT INTERNAL DIMENSION IN SYSTOLE: 3.44 CM (ref 2.1–4)
LEFT VENTRICLE DIASTOLIC VOLUME INDEX: 56 ML/M2
LEFT VENTRICLE DIASTOLIC VOLUME: 112 ML
LEFT VENTRICLE MASS INDEX: 54 G/M2
LEFT VENTRICLE SYSTOLIC VOLUME INDEX: 24.4 ML/M2
LEFT VENTRICLE SYSTOLIC VOLUME: 48.8 ML
LEFT VENTRICULAR INTERNAL DIMENSION IN DIASTOLE: 4.88 CM (ref 3.5–6)
LEFT VENTRICULAR MASS: 108.99 G
LV LATERAL E/E' RATIO: 8.1 M/S
LV SEPTAL E/E' RATIO: 8.1 M/S
LVOT MG: 3 MMHG
LVOT MV: 0.72 CM/S
LYMPHOCYTES NFR BLD MANUAL: 2.27 X10(3)/MCL
LYMPHOCYTES NFR BLD MANUAL: 22 %
MAGNESIUM SERPL-MCNC: 2.1 MG/DL (ref 1.6–2.6)
MCH RBC QN AUTO: 28.2 PG (ref 27–31)
MCHC RBC AUTO-ENTMCNC: 32.5 G/DL (ref 33–36)
MCV RBC AUTO: 86.6 FL (ref 80–94)
METAMYELOCYTES NFR BLD MANUAL: 1 %
MONO SCR (OHS): NEGATIVE
MONOCYTES NFR BLD MANUAL: 1.34 X10(3)/MCL (ref 0.1–1.3)
MONOCYTES NFR BLD MANUAL: 13 %
MV MEAN GRADIENT: 2 MMHG
MV PEAK A VEL: 0.52 M/S
MV PEAK E VEL: 0.81 M/S
MV PEAK GRADIENT: 4 MMHG
MV STENOSIS PRESSURE HALF TIME: 81 MS
MV VALVE AREA BY CONTINUITY EQUATION: 2.14 CM2
MV VALVE AREA P 1/2 METHOD: 2.72 CM2
MYELOCYTES NFR BLD MANUAL: 2 %
NEUTROPHILS NFR BLD MANUAL: 60 %
NRBC BLD AUTO-RTO: 0 %
OHS LV EJECTION FRACTION SIMPSONS BIPLANE MOD: 6 %
PHOSPHATE SERPL-MCNC: 3.8 MG/DL (ref 2.3–4.7)
PISA TR MAX VEL: 2.21 M/S
PLATELET # BLD AUTO: 259 X10(3)/MCL (ref 130–400)
PLATELET # BLD EST: NORMAL 10*3/UL
PMV BLD AUTO: 10.8 FL (ref 7.4–10.4)
POTASSIUM SERPL-SCNC: 3.4 MMOL/L (ref 3.5–5.1)
PROT SERPL-MCNC: 5.8 GM/DL (ref 6.4–8.3)
RA PRESSURE: 3 MMHG
RBC # BLD AUTO: 4.33 X10(6)/MCL (ref 4.7–6.1)
RBC MORPH BLD: NORMAL
RSV A 5' UTR RNA NPH QL NAA+PROBE: NOT DETECTED
SARS-COV-2 RNA RESP QL NAA+PROBE: DETECTED
SINUS: 2.9 CM
SODIUM SERPL-SCNC: 141 MMOL/L (ref 136–145)
T PALLIDUM AB SER QL: NONREACTIVE
TDI LATERAL: 0.1 M/S
TDI SEPTAL: 0.1 M/S
TDI: 0.1 M/S
TR MAX PG: 20 MMHG
TRICUSPID ANNULAR PLANE SYSTOLIC EXCURSION: 2.79 CM
TV REST PULMONARY ARTERY PRESSURE: 23 MMHG
WBC # SPEC AUTO: 10.32 X10(3)/MCL (ref 4.5–11.5)

## 2023-05-05 PROCEDURE — 86658 ENTEROVIRUS ANTIBODY: CPT

## 2023-05-05 PROCEDURE — 87389 HIV-1 AG W/HIV-1&-2 AB AG IA: CPT | Performed by: INTERNAL MEDICINE

## 2023-05-05 PROCEDURE — 86140 C-REACTIVE PROTEIN: CPT | Performed by: INTERNAL MEDICINE

## 2023-05-05 PROCEDURE — 80053 COMPREHEN METABOLIC PANEL: CPT | Performed by: INTERNAL MEDICINE

## 2023-05-05 PROCEDURE — 25000003 PHARM REV CODE 250: Performed by: INTERNAL MEDICINE

## 2023-05-05 PROCEDURE — 84100 ASSAY OF PHOSPHORUS: CPT | Performed by: INTERNAL MEDICINE

## 2023-05-05 PROCEDURE — 85027 COMPLETE CBC AUTOMATED: CPT | Performed by: INTERNAL MEDICINE

## 2023-05-05 PROCEDURE — 83735 ASSAY OF MAGNESIUM: CPT | Performed by: INTERNAL MEDICINE

## 2023-05-05 PROCEDURE — 85651 RBC SED RATE NONAUTOMATED: CPT | Performed by: INTERNAL MEDICINE

## 2023-05-05 PROCEDURE — 85060 BLOOD SMEAR INTERPRETATION: CPT | Performed by: INTERNAL MEDICINE

## 2023-05-05 PROCEDURE — G0378 HOSPITAL OBSERVATION PER HR: HCPCS

## 2023-05-05 PROCEDURE — 86308 HETEROPHILE ANTIBODY SCREEN: CPT | Performed by: INTERNAL MEDICINE

## 2023-05-05 PROCEDURE — 0241U COVID/RSV/FLU A&B PCR: CPT | Performed by: INTERNAL MEDICINE

## 2023-05-05 PROCEDURE — 86638 Q FEVER ANTIBODY: CPT | Mod: 90 | Performed by: INTERNAL MEDICINE

## 2023-05-05 PROCEDURE — 86780 TREPONEMA PALLIDUM: CPT | Performed by: INTERNAL MEDICINE

## 2023-05-05 PROCEDURE — 86658 ENTEROVIRUS ANTIBODY: CPT | Performed by: INTERNAL MEDICINE

## 2023-05-05 PROCEDURE — 80074 ACUTE HEPATITIS PANEL: CPT | Performed by: INTERNAL MEDICINE

## 2023-05-05 RX ORDER — NAPROXEN 500 MG/1
500 TABLET ORAL 2 TIMES DAILY WITH MEALS
Status: DISCONTINUED | OUTPATIENT
Start: 2023-05-05 | End: 2023-05-05 | Stop reason: HOSPADM

## 2023-05-05 RX ORDER — SODIUM CHLORIDE, SODIUM LACTATE, POTASSIUM CHLORIDE, CALCIUM CHLORIDE 600; 310; 30; 20 MG/100ML; MG/100ML; MG/100ML; MG/100ML
INJECTION, SOLUTION INTRAVENOUS CONTINUOUS
Status: DISCONTINUED | OUTPATIENT
Start: 2023-05-05 | End: 2023-05-05

## 2023-05-05 RX ORDER — COLCHICINE 0.6 MG/1
0.6 TABLET ORAL 2 TIMES DAILY
Qty: 10 TABLET | Refills: 0 | Status: SHIPPED | OUTPATIENT
Start: 2023-05-05 | End: 2023-06-01

## 2023-05-05 RX ORDER — PANTOPRAZOLE SODIUM 40 MG/1
40 TABLET, DELAYED RELEASE ORAL DAILY
Status: DISCONTINUED | OUTPATIENT
Start: 2023-05-05 | End: 2023-05-05 | Stop reason: HOSPADM

## 2023-05-05 RX ORDER — COLCHICINE 0.6 MG/1
0.6 TABLET, FILM COATED ORAL 2 TIMES DAILY
Status: DISCONTINUED | OUTPATIENT
Start: 2023-05-05 | End: 2023-05-05 | Stop reason: HOSPADM

## 2023-05-05 RX ORDER — PANTOPRAZOLE SODIUM 40 MG/1
40 TABLET, DELAYED RELEASE ORAL DAILY
Qty: 10 TABLET | Refills: 0 | Status: SHIPPED | OUTPATIENT
Start: 2023-05-06 | End: 2023-06-01

## 2023-05-05 RX ORDER — NAPROXEN 500 MG/1
500 TABLET ORAL 2 TIMES DAILY WITH MEALS
Qty: 10 TABLET | Refills: 0
Start: 2023-05-05 | End: 2023-05-10

## 2023-05-05 RX ADMIN — POTASSIUM BICARBONATE 50 MEQ: 977.5 TABLET, EFFERVESCENT ORAL at 10:05

## 2023-05-05 RX ADMIN — NAPROXEN 500 MG: 500 TABLET ORAL at 11:05

## 2023-05-05 RX ADMIN — PANTOPRAZOLE SODIUM 40 MG: 40 TABLET, DELAYED RELEASE ORAL at 10:05

## 2023-05-05 RX ADMIN — COLCHICINE 0.6 MG: 0.6 CAPSULE ORAL at 10:05

## 2023-05-05 NOTE — NURSING
Arrived to floor via WC with transport and spouse. Pt transferred to bed independently. Oriented to room and floor. ABX infusing to 20g in LAC, IV patent, no s/s of complications at site. Denies needs at this time. Bed low and locked, side rails up x 2, call light in reach. Instructed to call for assistance as needed. Monitoring ongoing.

## 2023-05-05 NOTE — DISCHARGE SUMMARY
[facility] Hospital Medicine Discharge Summary    Admit Date: 5/4/2023  Discharge Date and Time: 5/5/202311:23 AM  Admitting Physician: MACKENZIE Team  Discharging Physician: Meet Boyle MD.  Primary Care Physician: Aron Soriano MD      Discharge Diagnoses:  Acute viral febrile illness  Acute hepatitis and pleuropericarditis secondary to above       Hospital Course:   This is a 22-year-old male with no significant past medical history present to the ED with complaint of sore throat, nonproductive cough, fever, pleuritic chest pain, diffuse myalgia and arthralgia that started after he was diagnosed with influenza a by rapid antigen test on 04/24/2023.  Report his symptom has been gradually worsening since his influenza diagnosis and peaked in 1 week but improved over the past few days including cough and chest pain but continued to have fever and body aches.  His outpatient workup showing elevated transaminases that trended down although his WBC was increasing which is likely due to dehydration as his appetite has been low.  Rapid strep A negative.  His HIV was negative as well as EBV VCA IgM.  Reportedly tested negative for COVID-19 although I am not unable to find the results.  Respiratory PCR panel negative.  He was treated outpatient with doxycycline and cefdinir.       On arrival to ED he was afebrile and hemodynamically stable and saturating above 95% on room air.  Labs notable for improving leukocytosis (mostly monocytic), normal hemoglobin and platelets, and improving transaminases.  CT chest with contrast shows small pleural and pericardial effusion with minimal compressive atelectasis and no evidence of consolidation or pneumonia, there is borderline splenomegaly.  Ultrasound abdomen unremarkable.     He was given IV fluids, vancomycin and Zosyn and referred to hospital medicine service for further evaluation and management. Patient was diagnosed with viral syndrome  and iv antibiotics was stable. He was also diagnosed with pleuropericarditis and was stated on colchicin. He was stable and had a mild dry cough. No signs/symptoms of sepsis. He was ambulating well, afebrile. Labs and vitals stable. LFTs improving. He was discharged home in a stable condition.  Pt was seen and examined on the day of discharge  Vitals:  VITAL SIGNS: 24 HRS MIN & MAX LAST   Temp  Min: 98.1 °F (36.7 °C)  Max: 99 °F (37.2 °C) 98.5 °F (36.9 °C)   BP  Min: 116/67  Max: 131/77 127/72   Pulse  Min: 70  Max: 104  80   Resp  Min: 18  Max: 20 18   SpO2  Min: 96 %  Max: 99 % 96 %       Physical Exam:  Heart RRR  Lungs clear   Abdomen soft and non tender   Neuro: No FND      Procedures Performed: No admission procedures for hospital encounter.     Significant Diagnostic Studies: See Full reports for all details    Recent Labs   Lab 05/03/23  1343 05/04/23  1213 05/05/23  0345   WBC 16.16* 13.73* 10.32   RBC 4.91 4.69* 4.33*   HGB 13.9 13.3* 12.2*   HCT 40.4 40.4* 37.5*   MCV 82.3 86.1 86.6   MCH 28.3 28.4 28.2   MCHC 34.4 32.9* 32.5*   RDW 14.0 14.7 14.6    251 259   MPV 11.8 10.5* 10.8*       Recent Labs   Lab 05/03/23  1343 05/04/23  1213 05/05/23  0345    140 141   K 3.4* 3.5 3.4*   CO2 25 24 27   BUN 17.0 10.5 9.4   CREATININE 1.06 0.90 0.93   CALCIUM 8.1* 8.6 8.2*   MG  --   --  2.10   ALBUMIN 3.3* 2.9* 2.7*   ALKPHOS 206* 158* 135   * 158* 123*   * 85* 60*   BILITOT 1.2* 0.6 0.7        Microbiology Results (last 7 days)       Procedure Component Value Units Date/Time    Respiratory Culture [[id number]]     Order Status: Sent Specimen: Sputum     Blood Culture #2 **CANNOT BE ORDERED STAT** [[id number]] Collected: 05/04/23 1304    Order Status: Resulted Specimen: Blood Updated: 05/04/23 1308    Blood Culture #1 **CANNOT BE ORDERED STAT** [[id number]] Collected: 05/04/23 1304    Order Status: Resulted Specimen: Blood Updated: 05/04/23 1308             CT Chest With Contrast  Narrative:  EXAMINATION:  CT CHEST WITH CONTRAST    CLINICAL HISTORY:  Pneumonia, unresolved;    TECHNIQUE:  Contiguous CT images of the chest after IV contrast administration. Axial, coronal and sagittal reformatted images reviewed. Dose length product is 308 mGycm. Automatic exposure control was utilized to limit radiation dose.    COMPARISON:  No relevant comparison studies available at the time of dictation.    FINDINGS:  Lungs and large airways: Mild dependent atelectasis bilaterally.    Pleura: Small bilateral pleural effusions.    Mediastinum and suellen: Normal heart size and thoracic aortic caliber.  Small pericardial effusion.  No pathologically enlarged lymph node.    Chest wall/axilla and lower neck: No significant findings.    Upper abdomen: Prior cholecystectomy.  Borderline splenomegaly.    Bones: No acute osseous findings.  Impression: 1. Small pleural and pericardial effusions.  Areas of mild atelectasis adjacent to the pleural fluid.  2. Borderline splenomegaly.    Electronically signed by: Jeffrey Smyth  Date:    05/04/2023  Time:    17:05  US Abdomen Limited  Narrative: EXAMINATION:  US ABDOMEN LIMITED    CLINICAL HISTORY:  epigastric pain;    TECHNIQUE:  Gray-scale and color Doppler ultrasound images of the abdomen.    COMPARISON:  Ultrasound 11/28/2016    FINDINGS:  Pancreas partially obscured with no abnormality appreciated as imaged.  No significant findings regarding the visualized abdominal aorta and IVC.    Liver has normal size and echogenicity.  Main portal vein patent with normal flow direction.  Gallbladder not seen with reported prior cholecystectomy.  No biliary dilatation or ascites.    No hydronephrosis or defined calcification in the right kidney.    Measurements:    - Liver: 16.5 cm    - CBD diameter: 4 mm    - Right kidney: 9.8 cm in length  Impression: No sonographic abnormality identified.    Electronically signed by: Jeffrey Smyth  Date:    05/04/2023  Time:    13:49  X-Ray Chest PA And  Lateral  Narrative: EXAMINATION:  XR CHEST PA AND LATERAL    CLINICAL HISTORY:  Cough, unspecified    TECHNIQUE:  Two-view    COMPARISON:  May 2, 2023..    FINDINGS:  Cardiopericardial silhouette is within normal limits.  There are minimal left lower lung zone infiltrates and associated pleural effusion which is better visualized on the lateral projection and are without significant interval difference.  No new airspace opacities identified.  There is no pneumothorax.  Impression: Left lower lung zone minimal infiltrates and minimal pleural effusion, unchanged    Electronically signed by: Ulices Pollock  Date:    05/04/2023  Time:    11:46         Medication List        START taking these medications      colchicine 0.6 mg tablet  Commonly known as: COLCRYS  Take 1 tablet (0.6 mg total) by mouth 2 (two) times daily. for 5 days     naproxen 500 MG tablet  Commonly known as: NAPROSYN  Take 1 tablet (500 mg total) by mouth 2 (two) times daily with meals. for 5 days     pantoprazole 40 MG tablet  Commonly known as: PROTONIX  Take 1 tablet (40 mg total) by mouth once daily. for 10 days  Start taking on: May 6, 2023            STOP taking these medications      cefdinir 300 MG capsule  Commonly known as: OMNICEF     doxycycline 100 MG Cap  Commonly known as: VIBRAMYCIN     VICKS DAYQUIL COLD-FLU RELIEF ORAL               Where to Get Your Medications        These medications were sent to [facility] [address] [address]      Phone: [phone]   colchicine 0.6 mg tablet  pantoprazole 40 MG tablet       Information about where to get these medications is not yet available    Ask your nurse or doctor about these medications  naproxen 500 MG tablet          Explained in detail to the patient about the discharge plan, medications, and follow-up visits. Pt understands and agrees with the treatment plan  Discharge Disposition: Home or Self Care    Discharged Condition: stable  Diet-   Dietary Orders (From admission, onward)       Start     Ordered    05/04/23 2235  Diet heart healthy  Diet effective now         05/04/23 2234                   Medications Per DC med rec  Activities as tolerated    For further questions contact hospitalist office    Discharge time 33 minutes    For worsening symptoms, chest pain, shortness of breath, increased abdominal pain, high grade fever, stroke or stroke like symptoms, immediately go to the nearest Emergency Room or call 911 as soon as possible.      Meet Chambers M.D on 5/5/2023. at 11:23 AM.

## 2023-05-05 NOTE — H&P
[facility] Hospital Medicine - H&P Note    Patient Name: Leonides Rangel  : 2000  MRN: [id number]  PCP: Aron Soriano MD  Admitting Physician: Marietta Melton MD  Admission Class: OP- Observation   Length of Stay: 0  Face-to-Face encounter date: 2023  Code status: Full    Chief Complaint   Flu-like symptom for > 10 days    History of Present Illness   This is a 22-year-old male with no significant past medical history present to the ED with complaint of sore throat, nonproductive cough, fever, pleuritic chest pain, diffuse myalgia and arthralgia that started after he was diagnosed with influenza a by rapid antigen test on 2023.  Report his symptom has been gradually worsening since his influenza diagnosis and peaked in 1 week but improved over the past few days including cough and chest pain but continued to have fever and body aches.  His outpatient workup showing elevated transaminases that trended down although his WBC was increasing which is likely due to dehydration as his appetite has been low.  Rapid strep A negative.  His HIV was negative as well as EBV VCA IgM.  Reportedly tested negative for COVID-19 although I am not unable to find the results.  Respiratory PCR panel negative.  He was treated outpatient with doxycycline and cefdinir.      On arrival to ED he was afebrile and hemodynamically stable and saturating above 95% on room air.  Labs notable for improving leukocytosis (mostly monocytic), normal hemoglobin and platelets, and improving transaminases.  CT chest with contrast shows small pleural and pericardial effusion with minimal compressive atelectasis and no evidence of consolidation or pneumonia, there is borderline splenomegaly.  Ultrasound abdomen unremarkable.    He was given IV fluids, vancomycin and Zosyn and referred to hospital medicine service for further evaluation and management.      ROS   Except as documented, all other systems  reviewed and negative     Past Medical History   None    Past Surgical History     Past Surgical History:   Procedure Laterality Date    CHOLECYSTECTOMY      Foreign body removal arm      ORIF FINGER FRACTURE         Social History     Social History     Tobacco Use    Smoking status: Never    Smokeless tobacco: Never   Substance Use Topics    Alcohol use: Not Currently        Family History   Reviewed and negative    Allergies   Patient has no known allergies.    Home Medications     Prior to Admission medications    Medication Sig Start Date End Date Taking? Authorizing Provider   cefdinir (OMNICEF) 300 MG capsule Take 1 capsule (300 mg total) by mouth 2 (two) times daily. for 10 days 5/3/23 5/13/23 Yes Aron Soriano MD   doxycycline (VIBRAMYCIN) 100 MG Cap Take 1 capsule (100 mg total) by mouth every 12 (twelve) hours. for 10 days 5/3/23 5/13/23 Yes Aron Soriano MD   d-methorphan/PE/acetaminophen (VICKS DAYQUIL COLD-FLU RELIEF ORAL) Take by mouth.    Historical Provider        Physical Exam   Vital Signs  Temp:  [99 °F (37.2 °C)]   Pulse:  []   Resp:  [18-20]   BP: (116-131)/(67-77)   SpO2:  [97 %-98 %]    General: Appears comfortable  HEENT: NC/AT  Neck:  No JVD  Chest: CTABL  CVS: Regular rhythm. Normal S1/S2.  Abdomen: nondistended, normoactive BS, soft and non-tender.  MSK: No obvious deformity or joint swelling  Skin: Warm and dry  Neuro: AAOx3, no focal neurological deficit  Psych: Cooperative    Labs     Recent Labs     05/03/23  1343 05/04/23  1213   WBC 16.16* 13.73*   RBC 4.91 4.69*   HGB 13.9 13.3*   HCT 40.4 40.4*   MCV 82.3 86.1   MCH 28.3 28.4   MCHC 34.4 32.9*   RDW 14.0 14.7    251   ABSNEUT  --  8.238   SEDRATE 21*  --      Recent Labs     05/03/23  1343   *      Recent Labs     05/03/23  1343 05/04/23  1213    140   K 3.4* 3.5   CHLORIDE 109 109*   CO2 25 24   BUN 17.0 10.5   CREATININE 1.06 0.90   EGFRNORACEVR >90 >60   GLUCOSE 94 84   CALCIUM 8.1* 8.6    ALBUMIN 3.3* 2.9*   GLOBULIN 3.1 3.7*   ALKPHOS 206* 158*   * 158*   * 85*   BILITOT 1.2* 0.6   LIPASE  --  50     Recent Labs     05/04/23  1304   LACTIC 1.0     Recent Labs     05/02/23  0927 05/04/23  1213   CPK 55 56        Microbiology Results (last 7 days)       Procedure Component Value Units Date/Time    Respiratory Culture [[id number]]     Order Status: Sent Specimen: Sputum     Blood Culture #2 **CANNOT BE ORDERED STAT** [[id number]] Collected: 05/04/23 1304    Order Status: Resulted Specimen: Blood Updated: 05/04/23 1308    Blood Culture #1 **CANNOT BE ORDERED STAT** [[id number]] Collected: 05/04/23 1304    Order Status: Resulted Specimen: Blood Updated: 05/04/23 1308           Imaging     CT Chest With Contrast   Final Result      1. Small pleural and pericardial effusions.  Areas of mild atelectasis adjacent to the pleural fluid.   2. Borderline splenomegaly.         Electronically signed by: Jeffrey Smyth   Date:    05/04/2023   Time:    17:05      US Abdomen Limited   Final Result      No sonographic abnormality identified.         Electronically signed by: Jeffrey Smyth   Date:    05/04/2023   Time:    13:49      X-Ray Chest PA And Lateral   Final Result      Left lower lung zone minimal infiltrates and minimal pleural effusion, unchanged         Electronically signed by: Ulices Pollock   Date:    05/04/2023   Time:    11:46        Assessment & Plan   Acute viral febrile illness  Acute hepatitis and pleuropericarditis secondary to above      Plan:    Patient's constellation of symptoms consistent with acute viral illness though specific virus etiology unclear whether influenza or other respiratory viruses.  His symptom all are improving as well as as labs.  No indication for antibiotics at this time as no suspicion for bacterial infection.    Will check Blood culture x2, peripheral smear, ESR, CRP, ECHO, Hepatitis A/B/C acute panel, Coxsackie B antibodies, Q fever and also repeat COVID  testing for assurance.  Will start him on NSAIDs and colchicine.   VTE Prophylaxis:  Enoxaparin 40 mg subQ daily    Marietta Melton MD  Internal Medicine

## 2023-05-05 NOTE — NURSING
Nurses Note -- 4 Eyes      5/4/2023   9:33 PM      Skin assessed during: Admit      [x] No Pressure Injuries Present    []Prevention Measures Documented      [] Yes- Altered Skin Integrity Present or Discovered   [] LDA Added if Not in Epic (Describe Wound)   [] New Altered Skin Integrity was Present on Admit and Documented in LDA   [] Wound Image Taken    Wound Care Consulted? No    Attending Nurse:  Estella Lima RN     Second RN/Staff Member:  Esau Basilio RN

## 2023-05-05 NOTE — DISCHARGE INSTRUCTIONS
For worsening symptoms, chest pain, shortness of breath, increased abdominal pain, high grade fever, stroke or stroke like symptoms, immediately go to the nearest Emergency Room or call 911 as soon as possible.

## 2023-05-08 LAB
C BURNET PH1 IGG TITR SER IF: NORMAL {TITER}
C BURNET PH1 IGM TITR SER: NORMAL {TITER}
C BURNET PH2 IGG TITR SER IF: NORMAL {TITER}
C BURNET PH2 IGM TITR SER: NORMAL {TITER}
IMMUNOLOGIST REVIEW: NORMAL
PATH REV: NORMAL

## 2023-05-09 LAB
BACTERIA BLD CULT: NORMAL
BACTERIA BLD CULT: NORMAL

## 2023-05-16 LAB — BEAKER SEE SCANNED REPORT: NORMAL

## 2023-05-17 ENCOUNTER — OFFICE VISIT (OUTPATIENT)
Dept: FAMILY MEDICINE | Facility: CLINIC | Age: 23
End: 2023-05-17
Payer: COMMERCIAL

## 2023-05-17 VITALS
OXYGEN SATURATION: 98 % | HEART RATE: 112 BPM | DIASTOLIC BLOOD PRESSURE: 72 MMHG | HEIGHT: 68 IN | TEMPERATURE: 99 F | BODY MASS INDEX: 28.07 KG/M2 | SYSTOLIC BLOOD PRESSURE: 116 MMHG | WEIGHT: 185.19 LBS

## 2023-05-17 DIAGNOSIS — B34.9 ACUTE VIRAL DISEASE: Primary | ICD-10-CM

## 2023-05-17 DIAGNOSIS — M25.50 POLYARTHRALGIA: ICD-10-CM

## 2023-05-17 PROBLEM — M60.09 INFECTIVE MYOSITIS OF MULTIPLE SITES: Status: RESOLVED | Noted: 2023-05-04 | Resolved: 2023-05-17

## 2023-05-17 PROBLEM — K75.9 HEPATITIS: Status: RESOLVED | Noted: 2023-05-04 | Resolved: 2023-05-17

## 2023-05-17 PROBLEM — D69.6 THROMBOCYTOPENIA: Status: RESOLVED | Noted: 2023-05-02 | Resolved: 2023-05-17

## 2023-05-17 PROBLEM — B30.9 ACUTE VIRAL CONJUNCTIVITIS OF BOTH EYES: Status: RESOLVED | Noted: 2023-05-04 | Resolved: 2023-05-17

## 2023-05-17 PROBLEM — J18.9 PNEUMONIA DUE TO INFECTIOUS ORGANISM: Status: RESOLVED | Noted: 2023-05-02 | Resolved: 2023-05-17

## 2023-05-17 PROCEDURE — 3078F DIAST BP <80 MM HG: CPT | Mod: CPTII,,, | Performed by: FAMILY MEDICINE

## 2023-05-17 PROCEDURE — 3008F PR BODY MASS INDEX (BMI) DOCUMENTED: ICD-10-PCS | Mod: CPTII,,, | Performed by: FAMILY MEDICINE

## 2023-05-17 PROCEDURE — 1160F RVW MEDS BY RX/DR IN RCRD: CPT | Mod: CPTII,,, | Performed by: FAMILY MEDICINE

## 2023-05-17 PROCEDURE — 3008F BODY MASS INDEX DOCD: CPT | Mod: CPTII,,, | Performed by: FAMILY MEDICINE

## 2023-05-17 PROCEDURE — 1160F PR REVIEW ALL MEDS BY PRESCRIBER/CLIN PHARMACIST DOCUMENTED: ICD-10-PCS | Mod: CPTII,,, | Performed by: FAMILY MEDICINE

## 2023-05-17 PROCEDURE — 3078F PR MOST RECENT DIASTOLIC BLOOD PRESSURE < 80 MM HG: ICD-10-PCS | Mod: CPTII,,, | Performed by: FAMILY MEDICINE

## 2023-05-17 PROCEDURE — 3074F PR MOST RECENT SYSTOLIC BLOOD PRESSURE < 130 MM HG: ICD-10-PCS | Mod: CPTII,,, | Performed by: FAMILY MEDICINE

## 2023-05-17 PROCEDURE — 3074F SYST BP LT 130 MM HG: CPT | Mod: CPTII,,, | Performed by: FAMILY MEDICINE

## 2023-05-17 PROCEDURE — 1159F MED LIST DOCD IN RCRD: CPT | Mod: CPTII,,, | Performed by: FAMILY MEDICINE

## 2023-05-17 PROCEDURE — 1159F PR MEDICATION LIST DOCUMENTED IN MEDICAL RECORD: ICD-10-PCS | Mod: CPTII,,, | Performed by: FAMILY MEDICINE

## 2023-05-17 PROCEDURE — 99214 OFFICE O/P EST MOD 30 MIN: CPT | Mod: ,,, | Performed by: FAMILY MEDICINE

## 2023-05-17 PROCEDURE — 99214 PR OFFICE/OUTPT VISIT, EST, LEVL IV, 30-39 MIN: ICD-10-PCS | Mod: ,,, | Performed by: FAMILY MEDICINE

## 2023-05-17 NOTE — PROGRESS NOTES
"SUBJECTIVE:  HPI    Leonides Rangel is a 22 y.o. male here for Follow-up ([facility]).  Here for follow-up after recent hospitalization for pneumonia with hepatitis, thrombocytopenia, myositis.  Virtually all of his symptoms have completely resolved except now he is having some migratory polyarthritis including the wrists, hands, shoulders.  He also has some fatigue.  However, his symptoms are not activity limiting.  He is no longer having any fever, cough, shortness of breath.    I did review my prior office notes, his outpatient workup, his hospital labs and records.      Mavericks allergies, medications, history, and problem list were updated as appropriate.    ROS:  Pertinent ROS as above, otherwise negative    OBJECTIVE:  Vital signs  Visit Vitals  /72 (BP Location: Left arm)   Pulse (!) 112   Temp 99.1 °F (37.3 °C) (Temporal)   Ht 5' 7.99" (1.727 m)   Wt 84 kg (185 lb 3.2 oz)   SpO2 98%   BMI 28.17 kg/m²          PHYSICAL EXAM:  General:  Awake, alert, no acute distress   Musculoskeletal:  No wrist swelling.  Normal range of motion.  No MCP joint swelling.  Normal range of motion of the shoulders.  Normal gait.    ASSESSMENT/PLAN:  1. Acute viral disease    2. Polyarthralgia      I suspect that he had a nonspecific viral illness with the associated pneumonitis, arthritis, hepatitis.  However, if his symptoms should persist in another couple of weeks, rheumatologic workup should be undertaken I have asked him to come back in 2 weeks with the diarrhea of his symptoms.      Follow Up:  Follow up in about 2 weeks (around 5/31/2023) for Follow-up.          "

## 2023-06-01 ENCOUNTER — OFFICE VISIT (OUTPATIENT)
Dept: FAMILY MEDICINE | Facility: CLINIC | Age: 23
End: 2023-06-01
Payer: COMMERCIAL

## 2023-06-01 VITALS
HEIGHT: 67 IN | WEIGHT: 183.81 LBS | HEART RATE: 77 BPM | OXYGEN SATURATION: 98 % | TEMPERATURE: 98 F | BODY MASS INDEX: 28.85 KG/M2 | DIASTOLIC BLOOD PRESSURE: 60 MMHG | SYSTOLIC BLOOD PRESSURE: 106 MMHG

## 2023-06-01 DIAGNOSIS — R53.83 OTHER FATIGUE: ICD-10-CM

## 2023-06-01 DIAGNOSIS — R21 SKIN RASH: ICD-10-CM

## 2023-06-01 DIAGNOSIS — M25.50 POLYARTHRALGIA: Primary | ICD-10-CM

## 2023-06-01 PROBLEM — B34.9 ACUTE VIRAL DISEASE: Status: RESOLVED | Noted: 2023-05-04 | Resolved: 2023-06-01

## 2023-06-01 LAB
ALBUMIN SERPL-MCNC: 5.1 G/DL (ref 3.4–5)
ALBUMIN/GLOB SERPL: 1.6 RATIO
ALP SERPL-CCNC: 102 UNIT/L (ref 50–144)
ALT SERPL-CCNC: 64 UNIT/L (ref 1–45)
ANION GAP SERPL CALC-SCNC: 7 MEQ/L (ref 2–13)
AST SERPL-CCNC: 40 UNIT/L (ref 17–59)
BASOPHILS # BLD AUTO: 0.03 X10(3)/MCL (ref 0.01–0.08)
BASOPHILS NFR BLD AUTO: 0.5 % (ref 0.1–1.2)
BILIRUB SERPL-MCNC: NEGATIVE MG/DL
BILIRUBIN DIRECT+TOT PNL SERPL-MCNC: 0.7 MG/DL (ref 0–1)
BLOOD URINE, POC: NORMAL
BUN SERPL-MCNC: 14 MG/DL (ref 7–20)
C-REACTIVE PROTEIN(NORTH LA, ST. MARY, RP & JENNINGS): <0.5 MG/DL (ref 0–0.9)
CALCIUM SERPL-MCNC: 9.7 MG/DL (ref 8.4–10.2)
CHLORIDE SERPL-SCNC: 104 MMOL/L (ref 98–110)
CO2 SERPL-SCNC: 31 MMOL/L (ref 21–32)
COLOR, POC UA: YELLOW
CREAT SERPL-MCNC: 0.99 MG/DL (ref 0.66–1.25)
CREAT/UREA NIT SERPL: 14 (ref 12–20)
EOSINOPHIL # BLD AUTO: 0.26 X10(3)/MCL (ref 0.04–0.54)
EOSINOPHIL NFR BLD AUTO: 4.1 % (ref 0.7–7)
ERYTHROCYTE [DISTWIDTH] IN BLOOD BY AUTOMATED COUNT: 13.3 %
GFR SERPLBLD CREATININE-BSD FMLA CKD-EPI: >90 MLS/MIN/1.73/M2
GLOBULIN SER-MCNC: 3.1 GM/DL (ref 2–3.9)
GLUCOSE SERPL-MCNC: 103 MG/DL (ref 70–115)
GLUCOSE UR QL STRIP: NEGATIVE
HCT VFR BLD AUTO: 44.1 % (ref 36–52)
HGB BLD-MCNC: 15 G/DL (ref 13–18)
IMM GRANULOCYTES # BLD AUTO: 0.01 X10(3)/MCL (ref 0–0.03)
IMM GRANULOCYTES NFR BLD AUTO: 0.2 % (ref 0–0.5)
KETONES UR QL STRIP: NEGATIVE
LEUKOCYTE ESTERASE URINE, POC: NORMAL
LYMPHOCYTES # BLD AUTO: 2.92 X10(3)/MCL (ref 1.32–3.57)
LYMPHOCYTES NFR BLD AUTO: 46 % (ref 20–55)
MCH RBC QN AUTO: 29 PG (ref 27–34)
MCHC RBC AUTO-ENTMCNC: 34 G/DL (ref 31–37)
MCV RBC AUTO: 85.3 FL (ref 79–99)
MONOCYTES # BLD AUTO: 0.46 X10(3)/MCL (ref 0.3–0.82)
MONOCYTES NFR BLD AUTO: 7.2 % (ref 4.7–12.5)
NEUTROPHILS # BLD AUTO: 2.67 X10(3)/MCL (ref 1.78–5.38)
NEUTROPHILS NFR BLD AUTO: 42 % (ref 37–73)
NITRITE, POC UA: NEGATIVE
NRBC BLD AUTO-RTO: 0 %
PH, POC UA: 6.5
PLATELET # BLD AUTO: 309 X10(3)/MCL (ref 140–371)
PMV BLD AUTO: 9.8 FL (ref 9.4–12.4)
POTASSIUM SERPL-SCNC: 4.5 MMOL/L (ref 3.5–5.1)
PROT SERPL-MCNC: 8.2 GM/DL (ref 6.3–8.2)
PROTEIN, POC: NEGATIVE
RBC # BLD AUTO: 5.17 X10(6)/MCL (ref 4–6)
SODIUM SERPL-SCNC: 142 MMOL/L (ref 135–145)
SPECIFIC GRAVITY, POC UA: 1.02
URATE SERPL-MCNC: 8.1 MG/DL (ref 2.6–7.2)
UROBILINOGEN, POC UA: 0.2
WBC # SPEC AUTO: 6.35 X10(3)/MCL (ref 4–11.5)

## 2023-06-01 PROCEDURE — 86431 RHEUMATOID FACTOR QUANT: CPT | Performed by: FAMILY MEDICINE

## 2023-06-01 PROCEDURE — 81001 URINALYSIS AUTO W/SCOPE: CPT | Mod: ,,, | Performed by: FAMILY MEDICINE

## 2023-06-01 PROCEDURE — 1160F PR REVIEW ALL MEDS BY PRESCRIBER/CLIN PHARMACIST DOCUMENTED: ICD-10-PCS | Mod: CPTII,,, | Performed by: FAMILY MEDICINE

## 2023-06-01 PROCEDURE — 85025 COMPLETE CBC W/AUTO DIFF WBC: CPT | Performed by: FAMILY MEDICINE

## 2023-06-01 PROCEDURE — 85652 RBC SED RATE AUTOMATED: CPT | Performed by: FAMILY MEDICINE

## 2023-06-01 PROCEDURE — 3008F BODY MASS INDEX DOCD: CPT | Mod: CPTII,,, | Performed by: FAMILY MEDICINE

## 2023-06-01 PROCEDURE — 1159F MED LIST DOCD IN RCRD: CPT | Mod: CPTII,,, | Performed by: FAMILY MEDICINE

## 2023-06-01 PROCEDURE — 1160F RVW MEDS BY RX/DR IN RCRD: CPT | Mod: CPTII,,, | Performed by: FAMILY MEDICINE

## 2023-06-01 PROCEDURE — 84550 ASSAY OF BLOOD/URIC ACID: CPT | Performed by: FAMILY MEDICINE

## 2023-06-01 PROCEDURE — 3074F SYST BP LT 130 MM HG: CPT | Mod: CPTII,,, | Performed by: FAMILY MEDICINE

## 2023-06-01 PROCEDURE — 99214 OFFICE O/P EST MOD 30 MIN: CPT | Mod: ,,, | Performed by: FAMILY MEDICINE

## 2023-06-01 PROCEDURE — 80053 COMPREHEN METABOLIC PANEL: CPT | Performed by: FAMILY MEDICINE

## 2023-06-01 PROCEDURE — 3008F PR BODY MASS INDEX (BMI) DOCUMENTED: ICD-10-PCS | Mod: CPTII,,, | Performed by: FAMILY MEDICINE

## 2023-06-01 PROCEDURE — 86039 ANTINUCLEAR ANTIBODIES (ANA): CPT | Performed by: FAMILY MEDICINE

## 2023-06-01 PROCEDURE — 99214 PR OFFICE/OUTPT VISIT, EST, LEVL IV, 30-39 MIN: ICD-10-PCS | Mod: ,,, | Performed by: FAMILY MEDICINE

## 2023-06-01 PROCEDURE — 3074F PR MOST RECENT SYSTOLIC BLOOD PRESSURE < 130 MM HG: ICD-10-PCS | Mod: CPTII,,, | Performed by: FAMILY MEDICINE

## 2023-06-01 PROCEDURE — 86431 RHEUMATOID FACTOR QUANT: CPT | Mod: 91 | Performed by: FAMILY MEDICINE

## 2023-06-01 PROCEDURE — 1159F PR MEDICATION LIST DOCUMENTED IN MEDICAL RECORD: ICD-10-PCS | Mod: CPTII,,, | Performed by: FAMILY MEDICINE

## 2023-06-01 PROCEDURE — 3078F PR MOST RECENT DIASTOLIC BLOOD PRESSURE < 80 MM HG: ICD-10-PCS | Mod: CPTII,,, | Performed by: FAMILY MEDICINE

## 2023-06-01 PROCEDURE — 81001 POCT URINALYSIS, DIPSTICK OR TABLET REAGENT, AUTOMATED, WITH MICROSCOP: ICD-10-PCS | Mod: ,,, | Performed by: FAMILY MEDICINE

## 2023-06-01 PROCEDURE — 3078F DIAST BP <80 MM HG: CPT | Mod: CPTII,,, | Performed by: FAMILY MEDICINE

## 2023-06-01 PROCEDURE — 86140 C-REACTIVE PROTEIN: CPT | Performed by: FAMILY MEDICINE

## 2023-06-01 PROCEDURE — 86003 ALLG SPEC IGE CRUDE XTRC EA: CPT | Performed by: FAMILY MEDICINE

## 2023-06-01 NOTE — PROGRESS NOTES
"SUBJECTIVE:  HPI    Leonides Rangel is a 22 y.o. male here for Follow-up.  The patient continues to have decreased energy and fatigue associated with polyarthralgias.  He has pain in the knees, hips, shoulders myalgias.  He has also noted some pruritic skin papules present on the hands in the arms recently.  These papules are tender to palpation.  He denies any fever, chills, joint swelling, night sweats.    Office notes and ER notes and hospital notes since late April all reviewed.    Mavericks allergies, medications, history, and problem list were updated as appropriate.    ROS:  Pertinent ROS as above, otherwise negative    OBJECTIVE:  Vital signs  Visit Vitals  /60 (BP Location: Right arm, Patient Position: Sitting)   Pulse 77   Temp 98.2 °F (36.8 °C) (Temporal)   Ht 5' 7" (1.702 m)   Wt 83.4 kg (183 lb 12.8 oz)   SpO2 98%   BMI 28.79 kg/m²          PHYSICAL EXAM:  General:  Awake, alert, no acute distress   Eyes:  Pupils equal, round, reactive to light.  Conjunctiva normal bilaterally.  Neck:  No lymphadenopathy  Cardiovascular: Regular rate and rhythm.  No murmurs.  Respiratory: Clear to auscultation bilaterally, normal effort  Abdomen: Soft, nontender, nondistended, no hepatosplenomegaly or masses  Extremities:  No peripheral edema, no cyanosis  Musculoskeletal:  No wrist swelling.  Normal range of motion of the wrists.  No knee swelling.  Skin:  Multiple papules on the hands in the arms      Recent Results (from the past 24 hour(s))   POCT urinalysis, dipstick or tablet reag    Collection Time: 06/01/23  1:26 PM   Result Value Ref Range    Color, UA Yellow     Spec Grav UA 1.025     pH, UA 6.5     WBC, UA trace     Nitrite, UA negative     Protein, POC negative     Glucose, UA negative     Ketones, UA negative     Urobilinogen, UA 0.2     Bilirubin, POC negative     Blood, UA trace-intact        ASSESSMENT/PLAN:  1. Polyarthralgia  -     CYCLIC CITRULLINATED PEPTIDE (CCP) ANTIBODY; Future; Expected " date: 06/01/2023  -     Rheumatoid Factors, IgA, IgG, IgM; Future; Expected date: 06/01/2023  -     C-Reactive Protein; Future; Expected date: 06/01/2023  -     Sedimentation rate; Future; Expected date: 06/01/2023  -     Uric Acid; Future; Expected date: 06/01/2023  -     Comprehensive Metabolic Panel; Future; Expected date: 06/01/2023  -     CBC Auto Differential; Future; Expected date: 06/01/2023  -     FLETCHER IgG by IFA; Future; Expected date: 06/01/2023  -     POCT urinalysis, dipstick or tablet reag; Future; Expected date: 06/01/2023    2. Skin rash    3. Other fatigue  -     CYCLIC CITRULLINATED PEPTIDE (CCP) ANTIBODY; Future; Expected date: 06/01/2023  -     Rheumatoid Factors, IgA, IgG, IgM; Future; Expected date: 06/01/2023  -     C-Reactive Protein; Future; Expected date: 06/01/2023  -     Sedimentation rate; Future; Expected date: 06/01/2023  -     Uric Acid; Future; Expected date: 06/01/2023  -     Comprehensive Metabolic Panel; Future; Expected date: 06/01/2023  -     CBC Auto Differential; Future; Expected date: 06/01/2023  -     FLETCHER IgG by IFA; Future; Expected date: 06/01/2023  -     POCT urinalysis, dipstick or tablet reag; Future; Expected date: 06/01/2023      May need to consider a skin biopsy   Consider rheumatology referral in evaluation  We will review the above labs and make a follow-up decision from there.

## 2023-06-02 LAB
ERYTHROCYTE [SEDIMENTATION RATE] IN BLOOD: 4 MM/HR (ref 0–15)
RHEUMATOID FACT SERPL-ACNC: <13 IU/ML
RHEUMATOID FACTOR IGM QUANTITATIVE (OLG): 0.9 IU/ML

## 2023-06-03 LAB — ANA SER QL HEP2 SUBST: NORMAL

## 2023-06-05 LAB — RHEUMATOID FACTOR IGA QUANTITATIVE (OLG): 1.7 IU/ML

## 2023-06-06 ENCOUNTER — TELEPHONE (OUTPATIENT)
Dept: FAMILY MEDICINE | Facility: CLINIC | Age: 23
End: 2023-06-06
Payer: COMMERCIAL

## 2023-06-06 LAB — CYCLIC CITRULLINATED PEPTIDE (CCP) (OHS): 0.6 U/ML

## 2023-06-06 NOTE — TELEPHONE ENCOUNTER
----- Message from Aron Soriano MD sent at 6/6/2023  1:47 PM CDT -----  All of his lab work was normal.  I would recommend that he follow up in about 3 weeks and we can repeat some labs at that time, if necessary.

## 2023-06-19 ENCOUNTER — OFFICE VISIT (OUTPATIENT)
Dept: FAMILY MEDICINE | Facility: CLINIC | Age: 23
End: 2023-06-19
Payer: COMMERCIAL

## 2023-06-19 VITALS
HEART RATE: 97 BPM | SYSTOLIC BLOOD PRESSURE: 110 MMHG | TEMPERATURE: 100 F | HEIGHT: 68 IN | WEIGHT: 183.81 LBS | OXYGEN SATURATION: 98 % | DIASTOLIC BLOOD PRESSURE: 72 MMHG | BODY MASS INDEX: 27.86 KG/M2

## 2023-06-19 DIAGNOSIS — R50.9 FEVER, UNSPECIFIED FEVER CAUSE: ICD-10-CM

## 2023-06-19 DIAGNOSIS — M25.50 POLYARTHRALGIA: ICD-10-CM

## 2023-06-19 DIAGNOSIS — J02.9 SORE THROAT: ICD-10-CM

## 2023-06-19 DIAGNOSIS — J02.9 EXUDATIVE PHARYNGITIS: Primary | ICD-10-CM

## 2023-06-19 LAB
BASOPHILS # BLD AUTO: 0.04 X10(3)/MCL (ref 0.01–0.08)
BASOPHILS NFR BLD AUTO: 0.3 % (ref 0.1–1.2)
CTP QC/QA: YES
EOSINOPHIL # BLD AUTO: 0.13 X10(3)/MCL (ref 0.04–0.54)
EOSINOPHIL NFR BLD AUTO: 1 % (ref 0.7–7)
ERYTHROCYTE [DISTWIDTH] IN BLOOD BY AUTOMATED COUNT: 13 %
ERYTHROCYTE [SEDIMENTATION RATE] IN BLOOD: 14 MM/HR (ref 0–15)
HCT VFR BLD AUTO: 41.7 % (ref 36–52)
HGB BLD-MCNC: 14.2 G/DL (ref 13–18)
IMM GRANULOCYTES # BLD AUTO: 0.05 X10(3)/MCL (ref 0–0.03)
IMM GRANULOCYTES NFR BLD AUTO: 0.4 % (ref 0–0.5)
LYMPHOCYTES # BLD AUTO: 1.91 X10(3)/MCL (ref 1.32–3.57)
LYMPHOCYTES NFR BLD AUTO: 14 % (ref 20–55)
MCH RBC QN AUTO: 29.2 PG (ref 27–34)
MCHC RBC AUTO-ENTMCNC: 34.1 G/DL (ref 31–37)
MCV RBC AUTO: 85.8 FL (ref 79–99)
MOLECULAR STREP A: NEGATIVE
MONOCYTES # BLD AUTO: 1.21 X10(3)/MCL (ref 0.3–0.82)
MONOCYTES NFR BLD AUTO: 8.9 % (ref 4.7–12.5)
NEUTROPHILS # BLD AUTO: 10.26 X10(3)/MCL (ref 1.78–5.38)
NEUTROPHILS NFR BLD AUTO: 75.4 % (ref 37–73)
NRBC BLD AUTO-RTO: 0 %
PLATELET # BLD AUTO: 249 X10(3)/MCL (ref 140–371)
PMV BLD AUTO: 10.3 FL (ref 9.4–12.4)
POC MOLECULAR INFLUENZA A AGN: NEGATIVE
POC MOLECULAR INFLUENZA B AGN: NEGATIVE
RBC # BLD AUTO: 4.86 X10(6)/MCL (ref 4–6)
SARS-COV-2 AG RESP QL IA.RAPID: NEGATIVE
WBC # SPEC AUTO: 13.6 X10(3)/MCL (ref 4–11.5)

## 2023-06-19 PROCEDURE — 3008F BODY MASS INDEX DOCD: CPT | Mod: CPTII,,, | Performed by: NURSE PRACTITIONER

## 2023-06-19 PROCEDURE — 3074F PR MOST RECENT SYSTOLIC BLOOD PRESSURE < 130 MM HG: ICD-10-PCS | Mod: CPTII,,, | Performed by: NURSE PRACTITIONER

## 2023-06-19 PROCEDURE — 99213 OFFICE O/P EST LOW 20 MIN: CPT | Mod: ,,, | Performed by: NURSE PRACTITIONER

## 2023-06-19 PROCEDURE — 85652 RBC SED RATE AUTOMATED: CPT | Performed by: NURSE PRACTITIONER

## 2023-06-19 PROCEDURE — 86431 RHEUMATOID FACTOR QUANT: CPT | Performed by: NURSE PRACTITIONER

## 2023-06-19 PROCEDURE — 3078F PR MOST RECENT DIASTOLIC BLOOD PRESSURE < 80 MM HG: ICD-10-PCS | Mod: CPTII,,, | Performed by: NURSE PRACTITIONER

## 2023-06-19 PROCEDURE — 87502 INFLUENZA DNA AMP PROBE: CPT | Mod: QW,,, | Performed by: NURSE PRACTITIONER

## 2023-06-19 PROCEDURE — 99213 PR OFFICE/OUTPT VISIT, EST, LEVL III, 20-29 MIN: ICD-10-PCS | Mod: ,,, | Performed by: NURSE PRACTITIONER

## 2023-06-19 PROCEDURE — 1159F PR MEDICATION LIST DOCUMENTED IN MEDICAL RECORD: ICD-10-PCS | Mod: CPTII,,, | Performed by: NURSE PRACTITIONER

## 2023-06-19 PROCEDURE — 87502 POCT INFLUENZA A/B MOLECULAR: ICD-10-PCS | Mod: QW,,, | Performed by: NURSE PRACTITIONER

## 2023-06-19 PROCEDURE — 85025 COMPLETE CBC W/AUTO DIFF WBC: CPT | Performed by: NURSE PRACTITIONER

## 2023-06-19 PROCEDURE — 3008F PR BODY MASS INDEX (BMI) DOCUMENTED: ICD-10-PCS | Mod: CPTII,,, | Performed by: NURSE PRACTITIONER

## 2023-06-19 PROCEDURE — 1159F MED LIST DOCD IN RCRD: CPT | Mod: CPTII,,, | Performed by: NURSE PRACTITIONER

## 2023-06-19 PROCEDURE — 87811 SARS CORONAVIRUS 2 ANTIGEN POCT, MANUAL READ: ICD-10-PCS | Mod: QW,,, | Performed by: NURSE PRACTITIONER

## 2023-06-19 PROCEDURE — 3074F SYST BP LT 130 MM HG: CPT | Mod: CPTII,,, | Performed by: NURSE PRACTITIONER

## 2023-06-19 PROCEDURE — 87651 STREP A DNA AMP PROBE: CPT | Mod: QW,,, | Performed by: NURSE PRACTITIONER

## 2023-06-19 PROCEDURE — 87811 SARS-COV-2 COVID19 W/OPTIC: CPT | Mod: QW,,, | Performed by: NURSE PRACTITIONER

## 2023-06-19 PROCEDURE — 3078F DIAST BP <80 MM HG: CPT | Mod: CPTII,,, | Performed by: NURSE PRACTITIONER

## 2023-06-19 PROCEDURE — 87651 POCT STREP A MOLECULAR: ICD-10-PCS | Mod: QW,,, | Performed by: NURSE PRACTITIONER

## 2023-06-19 PROCEDURE — 86060 ANTISTREPTOLYSIN O TITER: CPT | Performed by: NURSE PRACTITIONER

## 2023-06-19 RX ORDER — CEFUROXIME AXETIL 500 MG/1
500 TABLET ORAL EVERY 12 HOURS
Qty: 20 TABLET | Refills: 0 | Status: SHIPPED | OUTPATIENT
Start: 2023-06-19 | End: 2023-06-29

## 2023-06-19 NOTE — PROGRESS NOTES
SUBJECTIVE:  Leonides Rangel is a 22 y.o. male here for Sore Throat and Fever      Sore Throat     Fever   Associated symptoms include a sore throat.   Presents to the clinic with c/o sore throat and fever.  Symptoms started yesterday,  Also with fatigue.  Reports his arthralgias had resolved and he had been feeling better.  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    Leonides's allergies, medications, history, and problem list were updated as appropriate.    Review of Systems   Constitutional:  Positive for fatigue and fever.   HENT:  Positive for sore throat.     A comprehensive review of symptoms was completed and negative except as noted above.    Recent Results (from the past 504 hour(s))   POCT urinalysis, dipstick or tablet reag    Collection Time: 06/01/23  1:26 PM   Result Value Ref Range    Color, UA Yellow     Spec Grav UA 1.025     pH, UA 6.5     WBC, UA trace     Nitrite, UA negative     Protein, POC negative     Glucose, UA negative     Ketones, UA negative     Urobilinogen, UA 0.2     Bilirubin, POC negative     Blood, UA trace-intact    CYCLIC CITRULLINATED PEPTIDE (CCP) ANTIBODY    Collection Time: 06/01/23  1:27 PM   Result Value Ref Range    CCP Quant 0.6 <7 U/mL   C-Reactive Protein    Collection Time: 06/01/23  1:27 PM   Result Value Ref Range    CRP <0.5 0 - 0.9 mg/dL   Sedimentation rate    Collection Time: 06/01/23  1:27 PM   Result Value Ref Range    Sed Rate 4 0 - 15 mm/hr   Uric Acid    Collection Time: 06/01/23  1:27 PM   Result Value Ref Range    Uric Acid 8.1 (H) 2.6 - 7.2 mg/dL   Comprehensive Metabolic Panel    Collection Time: 06/01/23  1:27 PM   Result Value Ref Range    Sodium Level 142 135 - 145 mmol/L    Potassium Level 4.5 3.5 - 5.1 mmol/L    Chloride 104 98 - 110 mmol/L    Carbon Dioxide 31 21 - 32 mmol/L    Glucose Level 103 70 - 115 mg/dL    Blood Urea Nitrogen 14.0 7.0 - 20.0 mg/dL    Creatinine 0.99 0.66 - 1.25 mg/dL    Calcium Level  Total 9.7 8.4 - 10.2 mg/dL    Protein Total 8.2 6.3 - 8.2 gm/dL    Albumin Level 5.1 (H) 3.4 - 5.0 g/dL    Globulin 3.1 2.0 - 3.9 gm/dL    Albumin/Globulin Ratio 1.6 ratio    Bilirubin Total 0.7 0.0 - 1.0 mg/dL    Alkaline Phosphatase 102 50 - 144 unit/L    Alanine Aminotransferase 64 (H) 1 - 45 unit/L    Aspartate Aminotransferase 40 17 - 59 unit/L    eGFR >90 mls/min/1.73/m2    Anion Gap 7.0 2.0 - 13.0 mEq/L    BUN/Creatinine Ratio 14 12 - 20   FLETCHER IgG by IFA    Collection Time: 06/01/23  1:27 PM   Result Value Ref Range    Antinuclear Ab, HEp-2 Substrate <1:80 (Negative) <1:80 (Negative)   CBC with Differential    Collection Time: 06/01/23  1:27 PM   Result Value Ref Range    WBC 6.35 4.00 - 11.50 x10(3)/mcL    RBC 5.17 4.00 - 6.00 x10(6)/mcL    Hgb 15.0 13.0 - 18.0 g/dL    Hct 44.1 36.0 - 52.0 %    MCV 85.3 79.0 - 99.0 fL    MCH 29.0 27.0 - 34.0 pg    MCHC 34.0 31.0 - 37.0 g/dL    RDW 13.3 %    Platelet 309 140 - 371 x10(3)/mcL    MPV 9.8 9.4 - 12.4 fL    Neut % 42.0 37 - 73 %    Lymph % 46.0 20 - 55 %    Mono % 7.2 4.7 - 12.5 %    Eos % 4.1 0.7 - 7 %    Basophil % 0.5 0.1 - 1.2 %    Lymph # 2.92 1.32 - 3.57 x10(3)/mcL    Neut # 2.67 1.78 - 5.38 x10(3)/mcL    Mono # 0.46 0.3 - 0.82 x10(3)/mcL    Eos # 0.26 0.04 - 0.54 x10(3)/mcL    Baso # 0.03 0.01 - 0.08 x10(3)/mcL    IG# 0.01 0.0001 - 0.031 x10(3)/mcL    IG% 0.2 0 - 0.5 %    NRBC% 0.0 <=1 %   RHEUMATOID FACTOR IGA    Collection Time: 06/01/23  1:27 PM   Result Value Ref Range    RA IgA Quant 1.7 <14 IU/mL   RHEUMATOID FACTOR IGM    Collection Time: 06/01/23  1:27 PM   Result Value Ref Range    RA IgM Quant 0.9 <3.5 IU/mL   Rheumatoid Quantitative    Collection Time: 06/01/23  1:27 PM   Result Value Ref Range    Rheumatoid Factor Quantitative <13 <=30 IU/mL   SARS Coronavirus 2 Antigen, POCT Manual Read    Collection Time: 06/19/23  8:42 AM   Result Value Ref Range    SARS Coronavirus 2 Antigen Negative Negative     Acceptable Yes    POCT  "Influenza A/B Molecular    Collection Time: 06/19/23  8:42 AM   Result Value Ref Range    POC Molecular Influenza A Ag Negative Negative, Not Reported    POC Molecular Influenza B Ag Negative Negative, Not Reported     Acceptable Yes    POCT Strep A, Molecular    Collection Time: 06/19/23  8:42 AM   Result Value Ref Range    Molecular Strep A, POC Negative Negative     Acceptable Yes        OBJECTIVE:  Vital signs  Vitals:    06/19/23 0813   BP: 110/72   BP Location: Left arm   Patient Position: Sitting   BP Method: Medium (Manual)   Pulse: 97   Temp: 100.2 °F (37.9 °C)   TempSrc: Oral   SpO2: 98%   Weight: 83.4 kg (183 lb 12.8 oz)   Height: 5' 8.31" (1.735 m)        Physical Exam  Constitutional:       Appearance: Normal appearance.   HENT:      Head: Normocephalic and atraumatic.      Right Ear: Tympanic membrane, ear canal and external ear normal.      Left Ear: Tympanic membrane, ear canal and external ear normal.      Nose: Nose normal.      Mouth/Throat:      Mouth: Mucous membranes are moist.      Pharynx: Oropharyngeal exudate and posterior oropharyngeal erythema present.   Eyes:      Conjunctiva/sclera: Conjunctivae normal.   Cardiovascular:      Rate and Rhythm: Normal rate and regular rhythm.   Pulmonary:      Effort: Pulmonary effort is normal.      Breath sounds: Normal breath sounds.   Abdominal:      General: Bowel sounds are normal.      Palpations: Abdomen is soft.   Musculoskeletal:         General: Normal range of motion.      Cervical back: Normal range of motion and neck supple.   Skin:     General: Skin is warm.      Capillary Refill: Capillary refill takes less than 2 seconds.   Neurological:      Mental Status: He is alert.   Psychiatric:         Mood and Affect: Mood normal.         Behavior: Behavior normal.         Judgment: Judgment normal.        ASSESSMENT/PLAN:  1. Exudative pharyngitis  -     cefUROXime (CEFTIN) 500 MG tablet; Take 1 tablet (500 mg total) " by mouth every 12 (twelve) hours. for 10 days  Dispense: 20 tablet; Refill: 0    2. Fever, unspecified fever cause  -     CBC Auto Differential; Future; Expected date: 06/19/2023  -     Sedimentation rate; Future; Expected date: 06/19/2023  -     SARS Coronavirus 2 Antigen, POCT Manual Read  -     POCT Strep A, Molecular  -     POCT Influenza A/B Molecular    3. Sore throat  -     SARS Coronavirus 2 Antigen, POCT Manual Read  -     POCT Strep A, Molecular  -     POCT Influenza A/B Molecular        Follow Up:  Follow up if symptoms worsen or fail to improve.

## 2023-06-20 LAB — RHEUMATOID FACTOR IGA QUANTITATIVE (OLG): 2.1 IU/ML

## 2023-06-21 LAB — ASO AB SERPL-ACNC: 109 IU/ML (ref 0–530)

## 2023-06-22 ENCOUNTER — TELEPHONE (OUTPATIENT)
Dept: FAMILY MEDICINE | Facility: CLINIC | Age: 23
End: 2023-06-22
Payer: COMMERCIAL

## 2023-06-22 NOTE — TELEPHONE ENCOUNTER
Pt states that he is feeling better but will continue to monitor symptoms        ----- Message from Aron Soriano MD sent at 6/22/2023  8:54 AM CDT -----  If he is still having any symptoms at the 1st part of next week, I would like for him to come back to the office for me to see him.

## 2024-05-30 ENCOUNTER — PATIENT MESSAGE (OUTPATIENT)
Dept: FAMILY MEDICINE | Facility: CLINIC | Age: 24
End: 2024-05-30
Payer: COMMERCIAL

## 2024-10-08 ENCOUNTER — OFFICE VISIT (OUTPATIENT)
Dept: FAMILY MEDICINE | Facility: CLINIC | Age: 24
End: 2024-10-08
Payer: COMMERCIAL

## 2024-10-08 VITALS
HEIGHT: 68 IN | BODY MASS INDEX: 29.91 KG/M2 | TEMPERATURE: 99 F | SYSTOLIC BLOOD PRESSURE: 110 MMHG | HEART RATE: 56 BPM | DIASTOLIC BLOOD PRESSURE: 80 MMHG | OXYGEN SATURATION: 97 % | WEIGHT: 197.38 LBS

## 2024-10-08 DIAGNOSIS — L30.1 DYSHIDROSIS (POMPHOLYX): Primary | ICD-10-CM

## 2024-10-08 PROCEDURE — 1159F MED LIST DOCD IN RCRD: CPT | Mod: CPTII,,, | Performed by: NURSE PRACTITIONER

## 2024-10-08 PROCEDURE — 99214 OFFICE O/P EST MOD 30 MIN: CPT | Mod: ,,, | Performed by: NURSE PRACTITIONER

## 2024-10-08 PROCEDURE — 3008F BODY MASS INDEX DOCD: CPT | Mod: CPTII,,, | Performed by: NURSE PRACTITIONER

## 2024-10-08 PROCEDURE — 3079F DIAST BP 80-89 MM HG: CPT | Mod: CPTII,,, | Performed by: NURSE PRACTITIONER

## 2024-10-08 PROCEDURE — 3074F SYST BP LT 130 MM HG: CPT | Mod: CPTII,,, | Performed by: NURSE PRACTITIONER

## 2024-10-08 RX ORDER — TRIAMCINOLONE ACETONIDE 1 MG/G
CREAM TOPICAL 2 TIMES DAILY
Qty: 30 G | Refills: 1 | Status: SHIPPED | OUTPATIENT
Start: 2024-10-08 | End: 2024-10-15

## 2024-10-08 NOTE — PROGRESS NOTES
"SUBJECTIVE:  Leonides Rangel is a 24 y.o. male here for Rash (Itchy rash to right hand x 1 week)      HPI  Presents to the clinc with c/o itcy hand rash for over a week.  Does not recall any injury or event.  No known exposures to irritants and not new soaps/detergents.    Brennan allergies, medications, history, and problem list were updated as appropriate.    Review of Systems   A comprehensive review of symptoms was completed and negative except as noted above.    No results found for this or any previous visit (from the past 3 weeks).    OBJECTIVE:  Vital signs  Vitals:    10/08/24 1114   BP: 110/80   BP Location: Right arm   Patient Position: Sitting   Pulse: (!) 56   Temp: 98.5 °F (36.9 °C)   TempSrc: Oral   SpO2: 97%   Weight: 89.5 kg (197 lb 6.4 oz)   Height: 5' 8.31" (1.735 m)        Physical Exam  Constitutional:       Appearance: Normal appearance.   HENT:      Head: Normocephalic and atraumatic.      Nose: Nose normal.      Mouth/Throat:      Mouth: Mucous membranes are moist.      Pharynx: Oropharynx is clear.   Eyes:      Conjunctiva/sclera: Conjunctivae normal.   Cardiovascular:      Rate and Rhythm: Normal rate and regular rhythm.   Pulmonary:      Effort: Pulmonary effort is normal.      Breath sounds: Normal breath sounds.   Abdominal:      General: Bowel sounds are normal.      Palpations: Abdomen is soft.   Musculoskeletal:         General: Normal range of motion.      Cervical back: Normal range of motion and neck supple.   Skin:     General: Skin is warm.      Capillary Refill: Capillary refill takes less than 2 seconds.      Findings: Rash present.      Comments: Right hand sweating with rash to palm - small deep lesions with vesicles and redness some of which are dry and peeling   Neurological:      Mental Status: He is alert.   Psychiatric:         Mood and Affect: Mood normal.         Behavior: Behavior normal.         Judgment: Judgment normal.          ASSESSMENT/PLAN:  1. Dyshidrosis " (pompholyx)  Comments:  keep hand clean and dry, aquaphour for moisture, and topical steroid BID  Orders:  -     triamcinolone acetonide 0.1% (KENALOG) 0.1 % cream; Apply topically 2 (two) times daily. for 7 days  Dispense: 30 g; Refill: 1        Follow Up:  Follow up if symptoms worsen or fail to improve.

## 2024-10-24 ENCOUNTER — PATIENT MESSAGE (OUTPATIENT)
Dept: FAMILY MEDICINE | Facility: CLINIC | Age: 24
End: 2024-10-24
Payer: COMMERCIAL

## 2024-10-24 DIAGNOSIS — R74.8 ELEVATED LIVER ENZYMES: Primary | ICD-10-CM

## 2024-10-30 ENCOUNTER — HOSPITAL ENCOUNTER (OUTPATIENT)
Dept: RADIOLOGY | Facility: HOSPITAL | Age: 24
Discharge: HOME | End: 2024-10-30
Attending: FAMILY MEDICINE
Payer: COMMERCIAL

## 2024-10-30 ENCOUNTER — TELEPHONE (OUTPATIENT)
Dept: FAMILY MEDICINE | Facility: CLINIC | Age: 24
End: 2024-10-30
Payer: COMMERCIAL

## 2024-10-30 ENCOUNTER — PATIENT MESSAGE (OUTPATIENT)
Dept: FAMILY MEDICINE | Facility: CLINIC | Age: 24
End: 2024-10-30
Payer: COMMERCIAL

## 2024-10-30 DIAGNOSIS — R74.8 ELEVATED LIVER ENZYMES: ICD-10-CM

## 2024-10-30 PROCEDURE — 76705 ECHO EXAM OF ABDOMEN: CPT | Mod: TC

## 2024-11-01 ENCOUNTER — OFFICE VISIT (OUTPATIENT)
Dept: FAMILY MEDICINE | Facility: CLINIC | Age: 24
End: 2024-11-01
Payer: COMMERCIAL

## 2024-11-01 VITALS
SYSTOLIC BLOOD PRESSURE: 114 MMHG | DIASTOLIC BLOOD PRESSURE: 80 MMHG | TEMPERATURE: 98 F | HEIGHT: 68 IN | WEIGHT: 196.38 LBS | BODY MASS INDEX: 29.76 KG/M2 | HEART RATE: 67 BPM | OXYGEN SATURATION: 98 %

## 2024-11-01 DIAGNOSIS — K75.81 NASH (NONALCOHOLIC STEATOHEPATITIS): Primary | ICD-10-CM

## 2024-11-05 ENCOUNTER — PATIENT MESSAGE (OUTPATIENT)
Dept: FAMILY MEDICINE | Facility: CLINIC | Age: 24
End: 2024-11-05
Payer: COMMERCIAL

## 2025-05-20 ENCOUNTER — RESULTS FOLLOW-UP (OUTPATIENT)
Dept: FAMILY MEDICINE | Facility: CLINIC | Age: 25
End: 2025-05-20

## 2025-05-20 ENCOUNTER — TELEPHONE (OUTPATIENT)
Dept: FAMILY MEDICINE | Facility: CLINIC | Age: 25
End: 2025-05-20

## 2025-05-20 ENCOUNTER — OFFICE VISIT (OUTPATIENT)
Dept: FAMILY MEDICINE | Facility: CLINIC | Age: 25
End: 2025-05-20
Payer: COMMERCIAL

## 2025-05-20 ENCOUNTER — PATIENT MESSAGE (OUTPATIENT)
Dept: FAMILY MEDICINE | Facility: CLINIC | Age: 25
End: 2025-05-20

## 2025-05-20 VITALS
OXYGEN SATURATION: 97 % | DIASTOLIC BLOOD PRESSURE: 74 MMHG | HEART RATE: 66 BPM | SYSTOLIC BLOOD PRESSURE: 122 MMHG | TEMPERATURE: 98 F | HEIGHT: 68 IN | WEIGHT: 194.81 LBS | BODY MASS INDEX: 29.52 KG/M2

## 2025-05-20 DIAGNOSIS — K75.81 NASH (NONALCOHOLIC STEATOHEPATITIS): ICD-10-CM

## 2025-05-20 DIAGNOSIS — Z00.00 WELL ADULT EXAM: Primary | ICD-10-CM

## 2025-05-20 PROBLEM — R21 SKIN RASH: Status: RESOLVED | Noted: 2023-06-01 | Resolved: 2025-05-20

## 2025-05-20 LAB
ALBUMIN SERPL-MCNC: 4.8 G/DL (ref 3.4–5)
ALBUMIN/GLOB SERPL: 1.6 RATIO
ALP SERPL-CCNC: 101 UNIT/L (ref 50–144)
ALT SERPL-CCNC: 66 UNIT/L (ref 1–45)
ANION GAP SERPL CALC-SCNC: 4 MEQ/L (ref 2–13)
AST SERPL-CCNC: 44 UNIT/L (ref 17–59)
BASOPHILS # BLD AUTO: 0.03 X10(3)/MCL (ref 0.01–0.08)
BASOPHILS NFR BLD AUTO: 0.5 % (ref 0.1–1.2)
BILIRUB SERPL-MCNC: 0.8 MG/DL (ref 0–1)
BUN SERPL-MCNC: 12 MG/DL (ref 7–20)
CALCIUM SERPL-MCNC: 10 MG/DL (ref 8.4–10.2)
CHLORIDE SERPL-SCNC: 106 MMOL/L (ref 98–110)
CO2 SERPL-SCNC: 31 MMOL/L (ref 21–32)
CREAT SERPL-MCNC: 1.17 MG/DL (ref 0.66–1.25)
CREAT/UREA NIT SERPL: 10 (ref 12–20)
EOSINOPHIL # BLD AUTO: 0.27 X10(3)/MCL (ref 0.04–0.54)
EOSINOPHIL NFR BLD AUTO: 4.4 % (ref 0.7–7)
ERYTHROCYTE [DISTWIDTH] IN BLOOD BY AUTOMATED COUNT: 12.6 %
EST. AVERAGE GLUCOSE BLD GHB EST-MCNC: 96.8 MG/DL (ref 70–115)
GFR SERPLBLD CREATININE-BSD FMLA CKD-EPI: 89 ML/MIN/1.73/M2
GLOBULIN SER-MCNC: 3 GM/DL (ref 2–3.9)
GLUCOSE SERPL-MCNC: 99 MG/DL (ref 70–115)
HBA1C MFR BLD: 5 % (ref 4–6)
HCT VFR BLD AUTO: 47.6 % (ref 36–52)
HGB BLD-MCNC: 15.9 G/DL (ref 13–18)
IMM GRANULOCYTES # BLD AUTO: 0.01 X10(3)/MCL (ref 0–0.03)
IMM GRANULOCYTES NFR BLD AUTO: 0.2 % (ref 0–0.5)
LYMPHOCYTES # BLD AUTO: 1.95 X10(3)/MCL (ref 1.32–3.57)
LYMPHOCYTES NFR BLD AUTO: 31.5 % (ref 20–55)
MCH RBC QN AUTO: 29.1 PG (ref 27–34)
MCHC RBC AUTO-ENTMCNC: 33.4 G/DL (ref 31–37)
MCV RBC AUTO: 87.2 FL (ref 79–99)
MONOCYTES # BLD AUTO: 0.63 X10(3)/MCL (ref 0.3–0.82)
MONOCYTES NFR BLD AUTO: 10.2 % (ref 4.7–12.5)
NEUTROPHILS # BLD AUTO: 3.31 X10(3)/MCL (ref 1.78–5.38)
NEUTROPHILS NFR BLD AUTO: 53.2 % (ref 37–73)
NRBC BLD AUTO-RTO: 0 %
PLATELET # BLD AUTO: 295 X10(3)/MCL (ref 140–371)
PMV BLD AUTO: 10.4 FL (ref 9.4–12.4)
POTASSIUM SERPL-SCNC: 4.5 MMOL/L (ref 3.5–5.1)
PROT SERPL-MCNC: 7.8 GM/DL (ref 6.3–8.2)
RBC # BLD AUTO: 5.46 X10(6)/MCL (ref 4–6)
SODIUM SERPL-SCNC: 141 MMOL/L (ref 136–145)
TSH SERPL-ACNC: 0.95 UIU/ML (ref 0.36–3.74)
WBC # BLD AUTO: 6.2 X10(3)/MCL (ref 4–11.5)

## 2025-05-20 PROCEDURE — 85025 COMPLETE CBC W/AUTO DIFF WBC: CPT | Performed by: FAMILY MEDICINE

## 2025-05-20 PROCEDURE — 84443 ASSAY THYROID STIM HORMONE: CPT | Performed by: FAMILY MEDICINE

## 2025-05-20 PROCEDURE — 3074F SYST BP LT 130 MM HG: CPT | Mod: CPTII,,, | Performed by: FAMILY MEDICINE

## 2025-05-20 PROCEDURE — 80053 COMPREHEN METABOLIC PANEL: CPT | Performed by: FAMILY MEDICINE

## 2025-05-20 PROCEDURE — 3008F BODY MASS INDEX DOCD: CPT | Mod: CPTII,,, | Performed by: FAMILY MEDICINE

## 2025-05-20 PROCEDURE — 1159F MED LIST DOCD IN RCRD: CPT | Mod: CPTII,,, | Performed by: FAMILY MEDICINE

## 2025-05-20 PROCEDURE — 99395 PREV VISIT EST AGE 18-39: CPT | Mod: ,,, | Performed by: FAMILY MEDICINE

## 2025-05-20 PROCEDURE — 3078F DIAST BP <80 MM HG: CPT | Mod: CPTII,,, | Performed by: FAMILY MEDICINE

## 2025-05-20 PROCEDURE — 1160F RVW MEDS BY RX/DR IN RCRD: CPT | Mod: CPTII,,, | Performed by: FAMILY MEDICINE

## 2025-05-20 PROCEDURE — 83036 HEMOGLOBIN GLYCOSYLATED A1C: CPT | Performed by: FAMILY MEDICINE

## 2025-05-20 NOTE — PROGRESS NOTES
"SUBJECTIVE:  HPI    Leonides Rangel is a 24 y.o. male here for Annual Exam (Discuss doing labs).   History of Present Illness    CHIEF COMPLAINT:  Patient presents today for follow up of fatty liver disease    HISTORY OF PRESENT ILLNESS:  He was last seen in October of previous year. He reports missing a scheduled appointment with GI specialist in [place] and is having difficulty rescheduling.    MUSCULOSKELETAL:  He reports neck and back soreness which he attributes to working out. He is currently under the care of Dr. Denise for these symptoms.    EXERCISE:  He reports performing cardio exercises and isometric strength training as part of lifestyle modifications.            [place] allergies, medications, history, and problem list were updated as appropriate.    ROS:  Pertinent ROS as above, otherwise negative    OBJECTIVE:  Vital signs  Visit Vitals  /74 (BP Location: Left arm, Patient Position: Sitting)   Pulse 66   Temp 97.5 °F (36.4 °C) (Temporal)   Ht 5' 8.31" (1.735 m)   Wt 88.4 kg (194 lb 12.8 oz)   SpO2 97%   BMI 29.35 kg/m²          PHYSICAL EXAM:  General: Awake, alert, no acute distress  Neck:  No bruits, no masses  Cardiovascular:  Regular rhythm.  Normal rate.  No murmurs.  Respiratory: Clear to auscultation bilaterally, normal effort  Abdomen: Soft, nontender, nondistended, no hepatosplenomegaly   Extremities:  No cyanosis, no clubbing, no edema  Skin:  No rashes or appreciable lesions   Neuro:  Cranial nerves 2-12 are intact with usual testing.  Gait is normal.  Moves all 4 extremities equally and symmetrically.  Psychiatric:  Normal mood and affect.        ASSESSMENT/PLAN:  1. Well adult exam  Assessment & Plan:  Continue therapeutic lifestyle changes    Check labs today and call with results    Orders:  -     CBC Auto Differential; Future; Expected date: 05/20/2025  -     Comprehensive Metabolic Panel; Future; Expected date: 05/20/2025  -     Hemoglobin A1C; Future; Expected date: " 05/20/2025  -     Lipid Panel; Future; Expected date: 06/20/2025  -     TSH; Future; Expected date: 05/20/2025  -     Lipid Panel; Future; Expected date: 05/20/2026  -     CBC Auto Differential; Future; Expected date: 05/20/2026  -     Comprehensive Metabolic Panel; Future; Expected date: 05/20/2026  -     TSH; Future; Expected date: 05/20/2026  -     Hemoglobin A1C; Future; Expected date: 05/20/2026    2. ESCUDERO (nonalcoholic steatohepatitis)  Overview:  October 30, 2024:  Anti smooth muscle antibodies negative, Ferritin 52.6, hepatitis-B and hepatitis-C studies were negative.    October 30, 2024: Ultrasound showed diffusely increased liver echogenicity consistent with hepatic steatosis.    Lab Results   Component Value Date    ALT 64 (H) 06/01/2023    AST 40 06/01/2023    ALKPHOS 102 06/01/2023    BILITOT 0.7 06/01/2023         Assessment & Plan:  Refer to GI for FibroSure scanning and ongoing surveillance    Lipid control, low-fat diet, regular exercise, avoidance of alcohol    Have recommended hepatitis-A and hepatitis-B vaccines.  Patient will consider and let us know.  We can vaccinate whenever he is ready.    Orders:  -     Ambulatory referral/consult to Gastroenterology; Future; Expected date: 05/27/2025        Follow Up:  Follow up in about 1 year (around 5/20/2026) for Well Adult, Fasting labs.        This note was generated with the assistance of ambient listening technology. Verbal consent was obtained by the patient and accompanying visitor(s) for the recording of patient appointment to facilitate this note. I attest to having reviewed and edited the generated note for accuracy, though some syntax or spelling errors may persist. Please contact the author of this note for any clarification.

## 2025-05-20 NOTE — ASSESSMENT & PLAN NOTE
Refer to GI for FibroSure scanning and ongoing surveillance    Lipid control, low-fat diet, regular exercise, avoidance of alcohol    Have recommended hepatitis-A and hepatitis-B vaccines.  Patient will consider and let us know.  We can vaccinate whenever he is ready.

## 2025-08-07 ENCOUNTER — OFFICE VISIT (OUTPATIENT)
Dept: FAMILY MEDICINE | Facility: CLINIC | Age: 25
End: 2025-08-07
Payer: COMMERCIAL

## 2025-08-07 VITALS
HEART RATE: 58 BPM | SYSTOLIC BLOOD PRESSURE: 126 MMHG | TEMPERATURE: 98 F | WEIGHT: 193.38 LBS | BODY MASS INDEX: 29.31 KG/M2 | DIASTOLIC BLOOD PRESSURE: 74 MMHG | HEIGHT: 68 IN

## 2025-08-07 DIAGNOSIS — M67.911 TENDINOPATHY OF RIGHT ROTATOR CUFF: ICD-10-CM

## 2025-08-07 DIAGNOSIS — M25.511 CHRONIC RIGHT SHOULDER PAIN: Primary | ICD-10-CM

## 2025-08-07 DIAGNOSIS — G89.29 CHRONIC RIGHT SHOULDER PAIN: Primary | ICD-10-CM

## 2025-08-07 RX ORDER — METHYLPREDNISOLONE ACETATE 80 MG/ML
80 INJECTION, SUSPENSION INTRA-ARTICULAR; INTRALESIONAL; INTRAMUSCULAR; SOFT TISSUE
Status: DISCONTINUED | OUTPATIENT
Start: 2025-08-07 | End: 2025-08-07 | Stop reason: HOSPADM

## 2025-08-07 RX ORDER — LIDOCAINE HYDROCHLORIDE 10 MG/ML
5 INJECTION, SOLUTION INFILTRATION; PERINEURAL
Status: DISCONTINUED | OUTPATIENT
Start: 2025-08-07 | End: 2025-08-07 | Stop reason: HOSPADM

## 2025-08-07 RX ADMIN — METHYLPREDNISOLONE ACETATE 80 MG: 80 INJECTION, SUSPENSION INTRA-ARTICULAR; INTRALESIONAL; INTRAMUSCULAR; SOFT TISSUE at 09:08

## 2025-08-07 RX ADMIN — LIDOCAINE HYDROCHLORIDE 5 ML: 10 INJECTION, SOLUTION INFILTRATION; PERINEURAL at 09:08

## 2025-08-07 NOTE — ASSESSMENT & PLAN NOTE
We discuss treatment options including NSAIDs, PT, injection, combination of the above    Patient elected to proceed with subacromial injection today     We will see him back in 3 weeks.  If he has failed to improve, we will check an MRI.    Home exercise program has been provided to perform twice daily 3 times a week    Avoid activity that aggravate shoulder pain in the interim

## 2025-08-07 NOTE — PROCEDURES
Large Joint Aspiration/Injection: R subacromial bursa    Date/Time: 8/7/2025 9:47 AM    Performed by: Aron Soriano MD  Authorized by: Aron Soriano MD    Prep: patient was prepped and draped in usual sterile fashion    Local anesthesia used?: No      Details:  Needle Size:  21 G  Ultrasonic Guidance for needle placement?: No    Approach:  Posterior  Location:  Shoulder  Site:  R subacromial bursa  Medications:  5 mL LIDOcaine HCL 10 mg/ml (1%) 10 mg/mL (1 %); 80 mg methylPREDNISolone acetate 80 mg/mL  Aspirate amount (mL):  0     Prepped in sterile fashion.  Posterior approach was used and injected 5 mL of lidocaine 1% plain and 80 mg of Depo-Medrol.  Tolerated well.  Aftercare discussed.

## 2025-08-07 NOTE — PROGRESS NOTES
"SUBJECTIVE:  HPI    Leonides Rangel is a 24 y.o. male here for Shoulder Pain (RT Shoulder. 2 yrs. ).   History of Present Illness    CHIEF COMPLAINT:  Patient presents today for right shoulder pain.    HISTORY OF PRESENT ILLNESS:  He reports right shoulder pain localized to the anterior shoulder muscle group since starting workouts in May. Pain is constant with a pulling sensation that makes him feel he needs to move the shoulder. Pain is exacerbated with resistance movements, overhead activities including pull-ups, and push-ups. The pain remains localized without radiation. He denies noticeable strength loss. Pain occurs with shoulder movements and does not require specific positioning to elicit discomfort.  Pain is also significantly impacting sleep as he can not lie on his right side.  Does not recall any specific trauma.  He feels like this pain has been present for 3 years but recently worsened in the last few months since he began working out regularly.    MEDICAL HISTORY:  He had a previous shoulder issue three years ago which was treated with physical therapy 2-3 times weekly, but has not sought additional treatment since then.            Leonides's allergies, medications, history, and problem list were updated as appropriate.    ROS:  Pertinent ROS as above, otherwise negative    OBJECTIVE:  Vital signs  Visit Vitals  /74 (BP Location: Left arm, Patient Position: Sitting)   Pulse (!) 58   Temp 98.2 °F (36.8 °C) (Temporal)   Ht 5' 8.31" (1.735 m)   Wt 87.7 kg (193 lb 6.4 oz)   PF 99 L/min   BMI 29.14 kg/m²          PHYSICAL EXAM:  Shoulder Musculoskeletal Exam    Inspection    Right      Right shoulder inspection is normal.    Left      Left shoulder inspection is normal.    Palpation    Right      Tenderness: present        Anterior shoulder: moderate        Posterior shoulder: none        Clavicle: none        AC joint: none        Sternoclavicular joint: none        Rotator cuff: moderate        " Trapezius: none        Medial scapula: none        Proximal biceps: none        Distal biceps: none    Left      Left shoulder palpation is normal.    Range of Motion    Right      Active forward elevation: 140.       Shoulder active abduction: 130.       Active external rotation at side: 60.       Internal rotation: T8.     Left      Left shoulder range of motion is normal.       Active forward elevation: 150.       Shoulder active abduction: 150.       Active external rotation at side: 70.       Internal rotation: T6.     Strength    Right      External rotation: 5/5.       Internal rotation: 4+/5. Internal rotation is affected by pain.       Abduction: 5/5. Abduction is affected by pain.       Biceps: 5/5.       Triceps: 5/5.     Left      External rotation: 5/5.       Internal rotation: 5/5.       Abduction: 5/5.       Biceps: 5/5.       Triceps: 5/5.         X-ray right shoulder, my interpretation prior to radiology report:  Normal.    Exam after injection:  Completely normal and equal range of motion and strength compared to the left.      ASSESSMENT/PLAN:  1. Chronic right shoulder pain  -     X-ray Shoulder 2 or More Views Right; Future; Expected date: 08/07/2025    2. Tendinopathy of right rotator cuff  Assessment & Plan:  We discuss treatment options including NSAIDs, PT, injection, combination of the above    Patient elected to proceed with subacromial injection today     We will see him back in 3 weeks.  If he has failed to improve, we will check an MRI.    Home exercise program has been provided to perform twice daily 3 times a week    Avoid activity that aggravate shoulder pain in the interim          Follow Up:  Follow up in about 3 weeks (around 8/28/2025) for Right shoulder pain.        This note was generated with the assistance of ambient listening technology. Verbal consent was obtained by the patient and accompanying visitor(s) for the recording of patient appointment to facilitate this note. I  attest to having reviewed and edited the generated note for accuracy, though some syntax or spelling errors may persist. Please contact the author of this note for any clarification.

## 2025-08-21 ENCOUNTER — OFFICE VISIT (OUTPATIENT)
Dept: FAMILY MEDICINE | Facility: CLINIC | Age: 25
End: 2025-08-21
Payer: COMMERCIAL

## 2025-08-21 VITALS
BODY MASS INDEX: 28.64 KG/M2 | SYSTOLIC BLOOD PRESSURE: 126 MMHG | TEMPERATURE: 98 F | HEIGHT: 68 IN | OXYGEN SATURATION: 98 % | HEART RATE: 66 BPM | WEIGHT: 189 LBS | DIASTOLIC BLOOD PRESSURE: 74 MMHG

## 2025-08-21 DIAGNOSIS — M25.511 CHRONIC RIGHT SHOULDER PAIN: ICD-10-CM

## 2025-08-21 DIAGNOSIS — M67.911 TENDINOPATHY OF RIGHT ROTATOR CUFF: Primary | ICD-10-CM

## 2025-08-21 DIAGNOSIS — G89.29 CHRONIC RIGHT SHOULDER PAIN: ICD-10-CM

## 2025-08-21 PROCEDURE — 3074F SYST BP LT 130 MM HG: CPT | Mod: CPTII,,, | Performed by: FAMILY MEDICINE

## 2025-08-21 PROCEDURE — 3008F BODY MASS INDEX DOCD: CPT | Mod: CPTII,,, | Performed by: FAMILY MEDICINE

## 2025-08-21 PROCEDURE — 3078F DIAST BP <80 MM HG: CPT | Mod: CPTII,,, | Performed by: FAMILY MEDICINE

## 2025-08-21 PROCEDURE — 3044F HG A1C LEVEL LT 7.0%: CPT | Mod: CPTII,,, | Performed by: FAMILY MEDICINE

## 2025-08-21 PROCEDURE — 1159F MED LIST DOCD IN RCRD: CPT | Mod: CPTII,,, | Performed by: FAMILY MEDICINE

## 2025-08-21 PROCEDURE — 1160F RVW MEDS BY RX/DR IN RCRD: CPT | Mod: CPTII,,, | Performed by: FAMILY MEDICINE

## 2025-08-21 PROCEDURE — 99214 OFFICE O/P EST MOD 30 MIN: CPT | Mod: ,,, | Performed by: FAMILY MEDICINE

## 2025-08-26 ENCOUNTER — HOSPITAL ENCOUNTER (OUTPATIENT)
Dept: RADIOLOGY | Facility: HOSPITAL | Age: 25
Discharge: HOME | End: 2025-08-26
Attending: FAMILY MEDICINE
Payer: COMMERCIAL

## 2025-08-26 DIAGNOSIS — M25.511 CHRONIC RIGHT SHOULDER PAIN: ICD-10-CM

## 2025-08-26 DIAGNOSIS — G89.29 CHRONIC RIGHT SHOULDER PAIN: ICD-10-CM

## 2025-08-26 PROCEDURE — 73221 MRI JOINT UPR EXTREM W/O DYE: CPT | Mod: TC,RT

## 2025-08-27 ENCOUNTER — PATIENT MESSAGE (OUTPATIENT)
Dept: FAMILY MEDICINE | Facility: CLINIC | Age: 25
End: 2025-08-27
Payer: COMMERCIAL

## 2025-08-27 ENCOUNTER — TELEPHONE (OUTPATIENT)
Dept: FAMILY MEDICINE | Facility: CLINIC | Age: 25
End: 2025-08-27
Payer: COMMERCIAL

## 2025-08-27 DIAGNOSIS — S43.431D TEAR OF RIGHT GLENOID LABRUM, SUBSEQUENT ENCOUNTER: ICD-10-CM

## 2025-08-27 DIAGNOSIS — M25.511 CHRONIC RIGHT SHOULDER PAIN: Primary | ICD-10-CM

## 2025-08-27 DIAGNOSIS — G89.29 CHRONIC RIGHT SHOULDER PAIN: Primary | ICD-10-CM
